# Patient Record
Sex: MALE | Race: WHITE
[De-identification: names, ages, dates, MRNs, and addresses within clinical notes are randomized per-mention and may not be internally consistent; named-entity substitution may affect disease eponyms.]

---

## 2018-10-20 ENCOUNTER — HOSPITAL ENCOUNTER (INPATIENT)
Dept: HOSPITAL 14 - H.ER | Age: 78
LOS: 4 days | Discharge: NURSING, REHAB OR LONG-TERM CARE | DRG: 86 | End: 2018-10-24
Attending: FAMILY MEDICINE | Admitting: FAMILY MEDICINE
Payer: MEDICARE
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
## 2018-10-20 DIAGNOSIS — I44.0: ICD-10-CM

## 2018-10-20 DIAGNOSIS — Z95.1: ICD-10-CM

## 2018-10-20 DIAGNOSIS — K59.00: ICD-10-CM
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
## 2018-10-20 DIAGNOSIS — E11.9: ICD-10-CM

## 2018-10-20 DIAGNOSIS — Z91.81: ICD-10-CM

## 2018-10-20 DIAGNOSIS — I45.10: ICD-10-CM

## 2018-10-20 DIAGNOSIS — G20: ICD-10-CM

## 2018-10-20 DIAGNOSIS — I10: ICD-10-CM

## 2018-10-20 DIAGNOSIS — F02.80: ICD-10-CM

## 2018-10-20 DIAGNOSIS — I25.10: ICD-10-CM

## 2018-10-20 DIAGNOSIS — S22.42XA: ICD-10-CM

## 2018-10-20 DIAGNOSIS — S06.301A: Primary | ICD-10-CM

## 2018-10-20 LAB
ALBUMIN SERPL-MCNC: 4.4 G/DL (ref 3.5–5)
ALBUMIN/GLOB SERPL: 1.2 {RATIO} (ref 1–2.1)
ALT SERPL-CCNC: 25 U/L (ref 21–72)
APTT BLD: 31.5 SECONDS (ref 25.6–37.1)
AST SERPL-CCNC: 34 U/L (ref 17–59)
BACTERIA #/AREA URNS HPF: (no result) /[HPF]
BASOPHILS # BLD AUTO: 0 K/UL (ref 0–0.2)
BASOPHILS NFR BLD: 0.4 % (ref 0–2)
BILIRUB UR-MCNC: NEGATIVE MG/DL
BUN SERPL-MCNC: 31 MG/DL (ref 9–20)
CALCIUM SERPL-MCNC: 9.5 MG/DL (ref 8.4–10.2)
COLOR UR: YELLOW
EOSINOPHIL # BLD AUTO: 0.2 K/UL (ref 0–0.7)
EOSINOPHIL NFR BLD: 1.6 % (ref 0–4)
ERYTHROCYTE [DISTWIDTH] IN BLOOD BY AUTOMATED COUNT: 14.2 % (ref 11.5–14.5)
GFR NON-AFRICAN AMERICAN: 45
GLUCOSE UR STRIP-MCNC: 50 MG/DL
HGB BLD-MCNC: 13 G/DL (ref 12–18)
INR PPP: 1.1
LEUKOCYTE ESTERASE UR-ACNC: (no result) LEU/UL
LYMPHOCYTES # BLD AUTO: 1.4 K/UL (ref 1–4.3)
LYMPHOCYTES NFR BLD AUTO: 14.3 % (ref 20–40)
MCH RBC QN AUTO: 32.2 PG (ref 27–31)
MCHC RBC AUTO-ENTMCNC: 33.1 G/DL (ref 33–37)
MCV RBC AUTO: 97.1 FL (ref 80–94)
MONOCYTES # BLD: 0.9 K/UL (ref 0–0.8)
MONOCYTES NFR BLD: 9.3 % (ref 0–10)
NEUTROPHILS # BLD: 7.3 K/UL (ref 1.8–7)
NEUTROPHILS NFR BLD AUTO: 74.4 % (ref 50–75)
NRBC BLD AUTO-RTO: 0.1 % (ref 0–0)
PH UR STRIP: 5 [PH] (ref 5–8)
PLATELET # BLD: 144 K/UL (ref 130–400)
PMV BLD AUTO: 9.6 FL (ref 7.2–11.7)
PROT UR STRIP-MCNC: 100 MG/DL
PROTHROMBIN TIME: 12.2 SECONDS (ref 9.8–13.1)
RBC # BLD AUTO: 4.05 MIL/UL (ref 4.4–5.9)
RBC # UR STRIP: NEGATIVE /UL
SP GR UR STRIP: 1.02 (ref 1–1.03)
SQUAMOUS EPITHIAL: < 1 /HPF (ref 0–5)
URINE CLARITY: CLEAR
UROBILINOGEN UR-MCNC: (no result) MG/DL (ref 0.2–1)
WBC # BLD AUTO: 9.8 K/UL (ref 4.8–10.8)

## 2018-10-20 NOTE — ED PDOC
HPI: Trauma/Fall





- HPI


Time Seen by Provider: 10/20/18 21:08


Chief Complaint (Nursing): Trauma


Chief Complaint (Provider): Trauma


History Per: Patient


Injury Occurred (Timing): Today @ (1400)


Additional Complaint(s): 





Jonatan Cline is a 78 year old male with a history of Parkinsons, HTN, 

CAD and 5 vessel CABG, who presented to the emergency department after 

sustaining an unwitnessed fall, associated with loss of consciousness, onset 

14:00 today. Patient was found by wife and EMS was called, however, patient 

refused to come with EMS. He took Tylenol for pain on his left side of chest but

had no relief. Patient's pain made him unable to get out of bed. He states he 

also has back pain and pain on left shoulder and chest. Patient denies 

headaches. His history is unreliable because of Parkinson and dementia. 





PMD: Dr. Paulette Wiggins MD


Cardiology: Dr. Gtz





Past Medical History


Reviewed: Historical Data, Nursing Documentation, Vital Signs


Vital Signs: 





                                Last Vital Signs











Temp  98.6 F   10/20/18 21:02


 


Pulse  80   10/20/18 21:02


 


Resp  16   10/20/18 21:02


 


BP  153/84 H  10/20/18 21:02


 


Pulse Ox  99   10/20/18 21:02














- Medical History


PMH: Diabetes, HTN, Parkinson's Disease





- Surgical History


Surgical History: CABG





- Family History


Family History: States: Unknown Family Hx





- Social History


Current smoker - smoking cessation education provided: No


Alcohol: None





- Home Medications


Home Medications: 


                                Ambulatory Orders











 Medication  Instructions  Recorded


 


RX: Carbidopa/Levodopa 25/250 25 - 250 tab PO QID 10/21/18





[Sinemet]  


 


RX: Insulin Glargine,Hum.rec.anlog 35 unit SQ HS 10/21/18





[Basaglar Kwikpen U-100]  


 


RX: Lisinopril [Zestril] 2.5 mg PO DAILY 10/21/18


 


RX: Metoprolol Succinate 25 mg PO DAILY 10/21/18





[Kapspargo Sprinkle]  


 


RX: Multivitamin/Iron/Folic Acid 1 each PO DAILY 10/21/18





[Certavite-Antioxidant Tablet]  


 


RX: Rasagiline Mesylate [Azilect] 1 mg PO DAILY 10/21/18


 


RX: Rosuvastatin Calcium [Crestor] 5 mg PO DAILY 10/21/18


 


RX: SITagliptin [Januvia] 100 mg PO DAILY 10/21/18


 


RX: Ammonium Lactate 12% 1 applic TOP Q8 10/24/18





[Lac-Hydrin 12% Lotion (225 g)]  


 


RX: Gabapentin [Neurontin] 100 mg PO Q12 10/24/18


 


RX: Insulin Human Regular [HumuLIN 0 units SC ACCU-CHECK  ml 10/24/18





R]  


 


RX: Lactulose [Enulose] 10 gm PO DAILY PRN  udc 10/24/18


 


RX: Lidocaine 5% [Lidoderm] 1 patch TOP 0600 10/24/18


 


RX: oxyCODONE/Acetaminophen 1 tab PO Q6 PRN  tab 10/24/18





[Percocet 5/325 mg Tab]  














- Allergies


Allergies/Adverse Reactions: 


                                    Allergies











Allergy/AdvReac Type Severity Reaction Status Date / Time


 


No Known Allergies Allergy   Verified 01/08/16 13:05














Review of Systems


ROS Statement: Except As Marked, All Systems Reviewed And Found Negative


Cardiovascular: Positive for: Chest Pain (left sided)


Musculoskeletal: Positive for: Shoulder Pain (left ), Back Pain


Neurological: Negative for: Headache





Physical Exam





- Reviewed


Nursing Documentation Reviewed: Yes


Vital Signs Reviewed: Yes





- Physical Exam


Appears: Positive for: In Acute Distress (mild painful distress)


Head Exam: Positive for: ATRAUMATIC, NORMOCEPHALIC


Skin: Positive for: Warm, Dry


Eye Exam: Positive for: EOMI, PERRL


ENT: Positive for: Pharynx Is (clear).  Negative for: Tonsillar Swelling


Neck: Positive for: Painless ROM, Supple


Cardiovascular/Chest: Negative for: Chest Non Tender (exquisite ttp to left 

lateral chest wall, posterior chest wall )


Respiratory: Positive for: Decreased Breath Sounds (poor air movement).  

Negative for: Respiratory Distress


Gastrointestinal/Abdominal: Positive for: Soft.  Negative for: Tenderness


Back: Positive for: L CVA Tenderness


Extremity: Positive for: Other (difuse resting tremor; cogwheel rigidity of 

upper arms consistent with parkinson)


Lymphatic: Negative for: Adenopathy


Neurologic/Psych: Positive for: Alert, Oriented (x2).  Negative for: 

Motor/Sensory Deficits





- Laboratory Results


Result Diagrams: 


                                 10/20/18 21:25





                                 10/23/18 09:38





- ECG


O2 Sat by Pulse Oximetry: 99 (RA)


Pulse Ox Interpretation: Normal





Medical Decision Making


Medical Decision Making: 





Initial Time: 21:18


Initial Impression: Fall, LOC and left sided pain


Initial Plan: 


--Ct of ches abd pel with iv contrast 


--Ct of head without contrast


--EKG 


--EDNURTX


--Chest xray 


--Urine Culture


--Type and screen 


--CMP 


--Creatine phosphokinase 


--Lact acid, plasma


--Magnesium 


--Phosphorous


--Troponin


--CBC with differential 


--Saline Lock 


--Sodium chloride 1,000 ml 








Time: 00:00


--Patient is endorsed to Dr. Daugherty pending CT results and re-evaluation.





 

--------------------------------------------------------------------------------


---------------------------------------





Scribe Attestation:


Documented by Mckay Wong, acting as a scribe for Uzma Jaramillo MD.





Provider Scribe Attestation:


All medical record entries made by the Scribe were at my direction and 

personally dictated by me. I have reviewed the chart and agree that the record 

accurately reflects my personal performance of the history, physical exam, 

medical decision making, and the department course for this patient. I have also

personally directed, reviewed, and agree with the discharge instructions and 

disposition.





Disposition





- Clinical Impression


Clinical Impression: 


 Syncope








- Disposition


Disposition: Transfer of Care


Disposition Time: 00:00


Condition: FAIR

## 2018-10-21 LAB — VIT B12 SERPL-MCNC: 444 PG/ML (ref 239–931)

## 2018-10-21 RX ADMIN — ACETAMINOPHEN AND CODEINE PHOSPHATE PRN TAB: 300; 30 TABLET ORAL at 20:11

## 2018-10-21 RX ADMIN — CARBIDOPA AND LEVODOPA SCH TAB: 25; 250 TABLET ORAL at 16:50

## 2018-10-21 RX ADMIN — CARBIDOPA AND LEVODOPA SCH TAB: 25; 250 TABLET ORAL at 13:00

## 2018-10-21 RX ADMIN — CARBIDOPA AND LEVODOPA SCH TAB: 25; 250 TABLET ORAL at 21:59

## 2018-10-21 RX ADMIN — ACETAMINOPHEN AND CODEINE PHOSPHATE PRN TAB: 300; 30 TABLET ORAL at 09:36

## 2018-10-21 RX ADMIN — Medication SCH TAB: at 11:29

## 2018-10-21 RX ADMIN — CARBIDOPA AND LEVODOPA SCH TAB: 25; 250 TABLET ORAL at 11:29

## 2018-10-21 NOTE — CP.PCM.CON
History of Present Illness





- History of Present Illness


History of Present Illness: 





   Neurology consult dictated. 


Patient seen: 78 yr old male with Parkinsons disease and history of falls, who 

fell and now has a small amount of intraventricular hemorrhage layering in 

bilateral atria. 


He is stable with no seizures, and baseline mental status. 


Plan: 


1. Neurosurgery consult. 


2. Hold all anticoagulants


3. Will evaluate parkinsons meds. 





Thank you


Dr. avila 





Past Patient History





- Past Medical History & Family History


Past Medical History?: Yes





- Past Social History


Smoking Status: Never Smoked





- CARDIAC


Hx Hypertension: Yes





- NEUROLOGICAL


Hx Parkinson's Disease: Yes





- ENDOCRINE/METABOLIC


Hx Endocrine Disorders: Yes





- MUSCULOSKELETAL/RHEUMATOLOGICAL


Hx Falls: Yes





- PSYCHIATRIC


Hx Substance Use: No





- SURGICAL HISTORY


Hx Coronary Artery Bypass Graft: Yes





- ANESTHESIA


Hx Anesthesia: Yes


Hx Anesthesia Reactions: No


Hx Malignant Hyperthermia: No





Meds


Allergies/Adverse Reactions: 


                                    Allergies











Allergy/AdvReac Type Severity Reaction Status Date / Time


 


No Known Allergies Allergy   Verified 01/08/16 13:05














- Medications


Medications: 


                               Current Medications





Acetaminophen/Codeine Phosphate (Tylenol/Codeine 300 Mg/30 Mg)  1 tab PO TID PRN


   PRN Reason: Pain, moderate (4-7)


   Last Admin: 10/21/18 09:36 Dose:  1 tab


Carbidopa/Levodopa (Sinemet)  1 tab PO QID [facility]


   Last Admin: 10/21/18 11:29 Dose:  1 tab


Insulin Human Regular (Humulin R)  0 units SC ACCU-CHECK ALEXEI; Protocol


Lidocaine (Lidoderm)  1 ea TD DAILY [facility]


   Last Admin: 10/21/18 11:31 Dose:  Not Given


Lisinopril (Zestril)  2.5 mg PO DAILY [facility]


   Last Admin: 10/21/18 11:28 Dose:  2.5 mg


Metoprolol Succinate (Toprol Xl)  25 mg PO DAILY [facility]


   Last Admin: 10/21/18 11:30 Dose:  25 mg


Multivitamins/Minerals (Therapeutic-M Tab)  1 tab PO DAILY [facility]


   Last Admin: 10/21/18 11:29 Dose:  1 tab


Sitagliptin Phosphate (Januvia)  100 mg PO DAILY [facility]


   Last Admin: 10/21/18 11:30 Dose:  100 mg











Results





- Vital Signs


Recent Vital Signs: 


                                Last Vital Signs











Temp  98.5 F   10/21/18 08:39


 


Pulse  65   10/21/18 08:39


 


Resp  20   10/21/18 08:39


 


BP  173/73 H  10/21/18 08:39


 


Pulse Ox  95   10/21/18 08:39














- Labs


Result Diagrams: 


                                 10/20/18 21:25





                                 10/20/18 21:25


Labs: 


                         Laboratory Results - last 24 hr











  10/20/18 10/20/18 10/20/18





  21:25 21:25 21:25


 


WBC  9.8  


 


RBC  4.05 L  


 


Hgb  13.0  


 


Hct  39.3  


 


MCV  97.1 H  


 


MCH  32.2 H  


 


MCHC  33.1  


 


RDW  14.2  


 


Plt Count  144  


 


MPV  9.6  


 


Neut % (Auto)  74.4  


 


Lymph % (Auto)  14.3 L  


 


Mono % (Auto)  9.3  


 


Eos % (Auto)  1.6  


 


Baso % (Auto)  0.4  


 


Neut # (Auto)  7.3 H  


 


Lymph # (Auto)  1.4  


 


Mono # (Auto)  0.9 H  


 


Eos # (Auto)  0.2  


 


Baso # (Auto)  0.0  


 


PT   


 


INR   


 


APTT   


 


Sodium   143 


 


Potassium   5.1 H 


 


Chloride   108 H 


 


Carbon Dioxide   26 


 


Anion Gap   14 


 


BUN   31 H 


 


Creatinine   1.5 


 


Est GFR ( Amer)   54 


 


Est GFR (Non-Af Amer)   45 


 


Random Glucose   141 H 


 


Lactic Acid    1.5


 


Calcium   9.5 


 


Phosphorus   3.1 


 


Magnesium   2.0 


 


Total Bilirubin   1.6 H 


 


AST   34 


 


ALT   25 


 


Alkaline Phosphatase   80 


 


Total Creatine Kinase   239 H 


 


Troponin I   < 0.0120 


 


Total Protein   8.1 


 


Albumin   4.4 


 


Globulin   3.7 


 


Albumin/Globulin Ratio   1.2 


 


Urine Color   


 


Urine Clarity   


 


Urine pH   


 


Ur Specific Gravity   


 


Urine Protein   


 


Urine Glucose (UA)   


 


Urine Ketones   


 


Urine Blood   


 


Urine Nitrate   


 


Urine Bilirubin   


 


Urine Urobilinogen   


 


Ur Leukocyte Esterase   


 


Urine RBC (Auto)   


 


Urine Microscopic WBC   


 


Ur Squamous Epith Cells   


 


Urine Bacteria   














  10/20/18 10/20/18





  21:25 21:37


 


WBC  


 


RBC  


 


Hgb  


 


Hct  


 


MCV  


 


MCH  


 


MCHC  


 


RDW  


 


Plt Count  


 


MPV  


 


Neut % (Auto)  


 


Lymph % (Auto)  


 


Mono % (Auto)  


 


Eos % (Auto)  


 


Baso % (Auto)  


 


Neut # (Auto)  


 


Lymph # (Auto)  


 


Mono # (Auto)  


 


Eos # (Auto)  


 


Baso # (Auto)  


 


PT  12.2 


 


INR  1.1 


 


APTT  31.5 


 


Sodium  


 


Potassium  


 


Chloride  


 


Carbon Dioxide  


 


Anion Gap  


 


BUN  


 


Creatinine  


 


Est GFR ( Amer)  


 


Est GFR (Non-Af Amer)  


 


Random Glucose  


 


Lactic Acid  


 


Calcium  


 


Phosphorus  


 


Magnesium  


 


Total Bilirubin  


 


AST  


 


ALT  


 


Alkaline Phosphatase  


 


Total Creatine Kinase  


 


Troponin I  


 


Total Protein  


 


Albumin  


 


Globulin  


 


Albumin/Globulin Ratio  


 


Urine Color   Yellow


 


Urine Clarity   Clear


 


Urine pH   5.0


 


Ur Specific Gravity   1.019


 


Urine Protein   100


 


Urine Glucose (UA)   50


 


Urine Ketones   Negative


 


Urine Blood   Negative


 


Urine Nitrate   Negative


 


Urine Bilirubin   Negative


 


Urine Urobilinogen   0.2-1.0


 


Ur Leukocyte Esterase   Neg


 


Urine RBC (Auto)   3


 


Urine Microscopic WBC   < 1


 


Ur Squamous Epith Cells   < 1


 


Urine Bacteria   Occ H

## 2018-10-21 NOTE — RAD
Date of service: 



10/20/2018



HISTORY:

 LEFT sided chest pain trauma 



COMPARISON:

Correlation made with subsequent CT scan chest 10/20/2018 at 2346 hr 



FINDINGS:



LUNGS:

Mild bibasilar atelectasis



PLEURA:

No significant pleural effusion identified, no pneumothorax apparent.



CARDIOVASCULAR:

Minimal aortic atherosclerotic calcification present



Cardiomegaly..



OSSEOUS STRUCTURES:

No old healed fracture deformity left posterior 5th rib. 



VISUALIZED UPPER ABDOMEN:

Normal.



OTHER FINDINGS:

None.



IMPRESSION:

Mild bibasilar atelectasis. 



Cardiomegaly.

## 2018-10-21 NOTE — CARD
--------------- APPROVED REPORT --------------





Date of service: 10/20/2018



EKG Measurement

Heart [id number]

RI [id number]

[id number]

[id number]

[id number]



<Conclusion>

Sinus rhythm with 1st degree AV block

Right bundle branch block

Abnormal ECG

## 2018-10-21 NOTE — CT
Date of service: 



10/21/2018



PROCEDURE:  CT HEAD WITHOUT CONTRAST.



HISTORY:

compare



COMPARISON:

Comparison made with CT scan brain 10/20/2018.



TECHNIQUE:

Axial computed tomography images were obtained through the head/brain 

without intravenous contrast.  



Radiation dose:



Total exam DLP = 944.0 mGy-cm.



This CT exam was performed using one or more of the following dose 

reduction techniques: Automated exposure control, adjustment of the 

mA and/or kV according to patient size, and/or use of iterative 

reconstruction technique.



FINDINGS:



HEMORRHAGE:

Redemonstrated is a small amount of intraventricular hemorrhage is 

again seen layering in the dependent portion of both atria-occipital 

horns. 



BRAIN:

Moderate to fairly significant diffuse and confluent chronic white 

matter ischemic changes seen extending peripherally into the deep and 

subcortical white matter of both cerebral hemispheres.  In addition, 

there are scattered chronic bilateral basal nuclei lacunar type 

infarcts.  



Note that the possibility of a small hyperacute infarct cannot be 

excluded. 



Moderate to significant generalized volume loss 



Dense vascular calcifications both carotid siphons the the the and 

vertebral arteries.. 



VENTRICLES:

As above.  No obstructive hydrocephalus. 



CALVARIUM:

No acute calvarial fractures. 



PARANASAL SINUSES:

Mild mucosal thickening both maxillary antra the 



MASTOID AIR CELLS:

Unremarkable as visualized. No inflammatory changes.



OTHER FINDINGS:

Changes of bilateral cataract surgery.



IMPRESSION:

Redemonstrated is a small amount of intraventricular hemorrhage seen 

layering in the dependent portion of both atria and occipital horns. 



Moderate to significant chronic white matter ischemic changes with 

scattered chronic bilateral basal nuclei lacunar type infarcts. 



Moderate to fairly significant generalized volume loss.

## 2018-10-21 NOTE — RAD
Date of service: 



10/21/2018



PROCEDURE:  Radiographs of the Lumbar Spine.



HISTORY:

fall



COMPARISON:

Comparison made with CT scan chest abdomen pelvis 10/20/2018 which 

imaged the lumbar spine in 3 planes



FINDINGS:



BONES:

No acute fractures 



DISC SPACES:

Minor multilevel degenerative spondylosis lower thoracic and upper 

lumbar region 



OTHER FINDINGS:

None.



IMPRESSION:

No acute fractures. Please refer to CT scan chest abdomen pelvis and 

corresponding report 10/20/2018 for additional details

## 2018-10-21 NOTE — CP.PCM.PN
Subjective





- Date & Time of Evaluation


Date of Evaluation: 10/21/18


Time of Evaluation: 11:51





- Subjective


Subjective: 





79 yo male who has mult falls adm yest am with ct showing  small ampount of 

blood in both occip horns of the ventricles


repeat ct 24 hrs later unchanged


no surgical intervention indicated


neurosurgically clear for d/c





Objective





- Vital Signs/Intake and Output


Vital Signs (last 24 hours): 


                                        











Temp Pulse Resp BP Pulse Ox


 


 98.5 F   65   20   173/73 H  95 


 


 10/21/18 08:39  10/21/18 08:39  10/21/18 08:39  10/21/18 08:39  10/21/18 08:39











- Medications


Medications: 


                               Current Medications





Acetaminophen/Codeine Phosphate (Tylenol/Codeine 300 Mg/30 Mg)  1 tab PO TID PRN


   PRN Reason: Pain, moderate (4-7)


   Last Admin: 10/21/18 09:36 Dose:  1 tab


Carbidopa/Levodopa (Sinemet)  1 tab PO QID [facility]


   Last Admin: 10/21/18 11:29 Dose:  1 tab


Insulin Human Regular (Humulin R)  0 units SC ACCU-CHECK ALEXEI; Protocol


Lidocaine (Lidoderm)  1 ea TD DAILY [facility]


   Last Admin: 10/21/18 11:31 Dose:  Not Given


Lisinopril (Zestril)  2.5 mg PO DAILY [facility]


   Last Admin: 10/21/18 11:28 Dose:  2.5 mg


Metoprolol Succinate (Toprol Xl)  25 mg PO DAILY [facility]


   Last Admin: 10/21/18 11:30 Dose:  25 mg


Multivitamins/Minerals (Therapeutic-M Tab)  1 tab PO DAILY [facility]


   Last Admin: 10/21/18 11:29 Dose:  1 tab


Sitagliptin Phosphate (Januvia)  100 mg PO DAILY [facility]


   Last Admin: 10/21/18 11:30 Dose:  100 mg











- Labs


Labs: 


                                        





                                 10/20/18 21:25 





                                 10/20/18 21:25 





                                        











PT  12.2 Seconds (9.8-13.1)   10/20/18  21:25    


 


INR  1.1   10/20/18  21:25    


 


APTT  31.5 Seconds (25.6-37.1)   10/20/18  21:25

## 2018-10-21 NOTE — ED PDOC
- Laboratory Results


Result Diagrams: 


                                 10/20/18 21:25





                                 10/20/18 21:25





- ECG


O2 Sat by Pulse Oximetry: 95





Medical Decision Making


Medical Decision Making: 


Time: 00:00


--Patient is endorsed to provider by Dr. Jaramillo pending imaging results and 

re-evaluation. 





Time: 0115


--CT Head Findings:


Chest: The pulmonary arteries are well-opacified with contrast, with no 

intraluminal filling defects to suggest embolism. The thoracic aorta is 

unremarkable. There is no pericardial or pleural effusion. The lungs demonstrate

interstitial changes in the lower lobes bilaterally. There is no thoracic 

lymphadenopathy. The thyroid gland is within normal limits.


Abdomen:  The liver, spleen, pancreas, kidneys, and adrenal glands are grossly 

unremarkable. Several simple cysts are seen in the left kidney. Numerous small 

densities are seen in the dependent portion of the gallbladder. No 

pericholecystic inflammatory changes are seen however. The aorta is within 

normal limits. There is no evidence of abdominal lymphadenopathy or ascites.


Pelvis: Moderate amount of stool fills the colon.  The colon is otherwise 

unremarkable, with no obstructive or inflammatory changes. There is mild fluid 

distention of the small bowel with moderate bowel wall thickening. The appendix 

is normal. The urinary bladder is within normal limits. The other pelvic 

structures appear grossly intact. There is no evidence of pelvic lymphadenopathy

or ascites.


Bones: There are no suspicious osseous abnormalities seen.


Impression: 


1. No acute traumatic injury appreciated.


2. Bilateral lower lobe infiltrate/atelectasis.


3. Cholelithiasis without evidence of acute cholecystitis.


4. Simple left renal cysts.


5. Moderate constipation.


6. Mild small bowel enteritis.





Time: 0129


--Patient will be admitted for under medical service, Dr. Cespedes. Dr. Cespedes 

made aware.





--------------------------------------------------------

-----------------------------------------


Scribe Attestation:


Documented by Nickie Barillas, acting as a scribe for Michael Daugherty MD.


Provider Scribe Attestation:


All medical record entries made by the Scribe were at my direction and 

personally dictated by me. I have reviewed the chart and agree that the record 

accurately reflects my personal performance of the history, physical exam, medic

al decision making, and the department course for this patient. I have also 

personally directed, reviewed, and agree with the discharge instructions and 

disposition.





Disposition





- Clinical Impression


Clinical Impression: 


 Syncope








- POA


Present On Arrival: None





- Disposition


Disposition: Admitted as In-Patient


Disposition Time: 02:00


Condition: FAIR

## 2018-10-21 NOTE — CT
Date of service: 



10/20/2018



PROCEDURE:  CT HEAD WITHOUT CONTRAST.



HISTORY:

fall LOC



COMPARISON:

Comparison made with CT scan brain 01/08/2016.



TECHNIQUE:

Axial computed tomography images were obtained through the head/brain 

without intravenous contrast.  



Radiation dose:



Total exam DLP = 1070.56 mGy-cm.



This CT exam was performed using one or more of the following dose 

reduction techniques: Automated exposure control, adjustment of the 

mA and/or kV according to patient size, and/or use of iterative 

reconstruction technique.



FINDINGS:



HEMORRHAGE:

There is a small amount of intraventricular hemorrhage seen layering 

in the dependent portions of both atria/occipital horns.  These 

changes are best demonstrated on axial image number series 4, image # 

19 and 20 



Note that these findings 



BRAIN:

Moderate to significant diffuse and confluent chronic white matter 

ischemic changes seen extending peripherally into the deep and 

subcortical white matter both cerebral hemispheres.  Extension into 

the white matter tracts of both basal nuclei.  Multiple more discrete 

deep and subcortical white matter as well as basal nuclei ischemic 

changes are present. 



No obvious parenchymal nor extra-axial mass or collection seen on 

this noncontrast study. 



Moderate to significant generalized volume loss. 



Dense vascular calcifications both carotid siphons 



VENTRICLES:

No obstructive hydrocephalus however note again made of asymmetry of 

the lateral ventricles left-sided which is larger than the right 

nonspecific.  Fetus B to yoga PA adenoma limits pigtail her in the 



CALVARIUM:

No acute calvarial fractures. There is localized scarring seen along 

the left superior frontal scalp. 



PARANASAL SINUSES:

Normal mucosal thickening right maxillary sinus. 



MASTOID AIR CELLS:

Unremarkable as visualized. No inflammatory changes.



OTHER FINDINGS:

Changes of bilateral cataract surgery.  There along a shin of the 

globes in the AP dimension with questionable coloboma/staphyloma 

formation. 



IMPRESSION:

There is small amount of intraventricular hemorrhage hemorrhage seen 

layering in both atria. 



Moderate to significant chronic white matter and basal nuclei 

ischemic changes. 



Moderate generalized volume loss. 



Findings were placed in PA review folder for follow up.. Note these 

findings were also discussed with emergency room MARQUIS Hidalgo at approximately 10 18 a.m. with written down and read back 

verification

## 2018-10-21 NOTE — CT
Date of service: 



10/20/2018



PROCEDURE:  CT Chest, Abdomen and Pelvis with intravenous contrast



HISTORY:

LEFT flank pain s/p fall



COMPARISON:

Comparison made with chest radiograph obtained earlier same day



TECHNIQUE:

Contiguous helical/transaxial sections of the chest and pelvis 

performed following intravenous injection of approximately 95 cc of 

Omnipaque 300.  Additional 2D sagittal and coronal reformats 

generated 



Radiation dose:



Total exam DLP = 961.7 mGy-cm.



This CT exam was performed using one or more of the following dose 

reduction techniques: Automated exposure control, adjustment of the 

mA and/or kV according to patient size, and/or use of iterative 

reconstruction technique.



FINDINGS:

.  No evidence of effusion or pneumothorax. 



CT CHEST WITH CONTRAST:



LUNGS:

Atelectatic changes are seen within both posterior lower lung fields 

with suspected mild pleural thickening left posterior sulcus..



MEDIASTINUM:

The sternotomy wires again noted heart is enlarged.  No significant 

pericardial effusion..  There is minimal aortic atherosclerotic 

calcification and mural plaque the no evidence of thoracic aortic 

aneurysm.



LYMPH NODES:

No significant mediastinal or hilar adenopathy.  Trachea midline and 

patent.  No large endoluminal lesions.  There is a small hiatal 

hernia with wall thickening of the distal esophagus likely due to 

protrusion gastric mucosa.  Esophagitis not excluded. 



PLEURA:

Tiny left-sided effusion not excluded 



BONES:

There are acute fractures of the left posterolateral 4th, 5th, 6th 

and 7th ribs with old healed fractures deformities of the left 

posterior 5th, 6th 7th and 8th ribs. There may be some localized 

subpleural induration or edema subjacent to the acute rib fractures. 



No evidence of acute compression fractures nor retropulsed fragments 

of the visualized thoracic spine however there are minor minor 

chronic superior endplate deformity of the T2 and to a lesser degree 

T4 and superior T10 endplates..  Vertebral bodies otherwise exhibit 

normal stature.  Vertebral bodies and facets normally aligned. 



Multilevel degenerative spondylosis. 



OTHER FINDINGS:

None.



CT ABDOMEN AND PELVIS:



LIVER:

The liver exhibits normal size measuring approximately 12.8 cm.  

Minimal fatty hepatic infiltration..  No gross lesion or ductal 

dilatation. 



GALLBLADDER AND BILE DUCTS:

Layering intraluminal gallbladder calculi and possible layering 

sludge. 



PANCREAS:

Unremarkable. No gross lesion or ductal dilatation.



SPLEEN:

Unremarkable. 



ADRENALS:

There are small round/elliptical shaped bilateral adrenal lesions; 

right-sided measuring approximately 17 mm and left side measuring 

approximately 13.7 mm...  The Hounsfield units obtained within the 

right adrenal lesion range in the upper 30s to mid 50s and on the 

left mid 40s.  These Hounsfield units are not compatible with simple 

adenoma and therefore follow-up MRI of the adrenal glands recommended 

for further evaluation.



KIDNEYS AND URETERS:

Kidneys appear intact and demonstrate relatively symmetric 

nephrograms.  There are several low-attenuation foci left kidney the 

largest focus along the anterolateral midpole region measuring 

approximately 21 mm.  These foci statistically probably represent 

small cysts.  Renal ultrasound follow-up could confirm. 



VASCULATURE:

There is mild atherosclerotic calcification or mural plaque present. 

Unremarkable. No aortic aneurysm. 



BOWEL:

Evaluation of the bowel is somewhat limited due to lack of oral 

contrast material.  The stomach is incompletely distended which 

presumably in part accounts for thick-walled appearance.  Visualized 

loops of small bowel exhibit normal contour and caliber.  No evidence 

of acute mechanical small bowel obstruction. 



Moderate amount of stool seen within the large bowel particularly 

within the cecum, at ascending and transverse colon consistent with 

fecal retention/constipation. 



APPENDIX:

Normal-appearing appendix. 



PERITONEUM:

Unremarkable. No free fluid. No free air. 



LYMPH NODES:

Unremarkable. No enlarged lymph nodes. 



BLADDER:

Urinary bladder appears incompletely distended however no evidence of 

intraluminal urinary bladder calculi. 



REPRODUCTIVE:

Prostate gland measures approximately 3.9 cm in transverse dimension. 

 Findings likely due to BPH however correlation with PSA recommended. 

 Prostatic calcifications are present. 



BONES:

Mild multilevel degenerative spondylosis of the lumbar spine.  There 

are no acute compression fractures no retropulsed fragments. 



OTHER FINDINGS:

None.



IMPRESSION:

Mild bibasilar atelectasis. 



There are acute fractures of the left posterolateral 4th, 5th, 6th 

and 7th ribs with old healed fractures deformities of the left 

posterior 5th, 6th 7th and 8th ribs..  There may be some localized 

subpleural induration or edema subjacent to the acute rib fractures. 



No evidence of intrathoracic or intra abdominal posttraumatic 

sequela. 



Cardiomegaly. 



Bilateral adrenal lesions right larger than left.  Recommend 

follow-up MRI of the adrenal glands. 



Minimal fatty hepatic infiltration.



There are at least 2 low-attenuation foci left kidney likely 

representing renal cysts.  Renal ultrasound follow-up could be 

performed to confirm. 



Findings consistent with constipation. 



Preliminary report is discordant.  This report was placed in [facility] 

review folder for follow up..

## 2018-10-21 NOTE — RAD
PROCEDURE:  Radiographs of the pelvis and bilateral hips



HISTORY:

 fall with pain 



COMPARISON:

Correlation made with CT scan chest abdomen pelvis 10/20/2018 which 

imaged the pelvis and both hips in 3 planes 



FINDINGS:



BONES:

No evidence of acute displaced fracture nor dislocation. The osseous 

structures appear intact. Both femoral heads are appropriately 

located within the respective acetabula. Minor degenerative 

osteoarthritis both hip joints..



JOINTS:

See above the as above 



SOFT TISSUES:

Vascular calcifications. Metallic clips both inguinal regions 

possibly due to prior inguinal hernia repair 



OTHER FINDINGS:

None.



IMPRESSION:

No evidence of acute displaced fracture nor dislocation. Mild 

degenerative osteoarthritis. 



No acute fractures. Please refer to CT scan chest abdomen pelvis and 

corresponding report 10/20/2018 for additional details

## 2018-10-21 NOTE — ED PDOC
ED Additional Note





- Date & Time of Evaluation


Date of Evaluation: 10/21/18





- Physician Additional Note


Physician Additional Note: 


This writer was called by radiologist re: Head CT performed yesterday that shows

small intracranial hemorrhage without mass effect that was not read last night. 

After patient's chart review, he was admitted under Dr. Cespedes whom I spoke with

about the CT scan finding. He stated understanding of the finding and that he 

would follow-up.

## 2018-10-22 LAB — FOLATE SERPL-MCNC: 11.6 NG/ML

## 2018-10-22 RX ADMIN — CARBIDOPA AND LEVODOPA SCH TAB: 25; 250 TABLET ORAL at 12:04

## 2018-10-22 RX ADMIN — CARBIDOPA AND LEVODOPA SCH TAB: 25; 250 TABLET ORAL at 21:42

## 2018-10-22 RX ADMIN — Medication SCH TAB: at 08:54

## 2018-10-22 RX ADMIN — ACETAMINOPHEN AND CODEINE PHOSPHATE PRN TAB: 300; 30 TABLET ORAL at 09:01

## 2018-10-22 RX ADMIN — CARBIDOPA AND LEVODOPA SCH TAB: 25; 250 TABLET ORAL at 08:54

## 2018-10-22 RX ADMIN — ACETAMINOPHEN AND CODEINE PHOSPHATE PRN TAB: 300; 30 TABLET ORAL at 16:53

## 2018-10-22 RX ADMIN — CARBIDOPA AND LEVODOPA SCH TAB: 25; 250 TABLET ORAL at 16:43

## 2018-10-22 NOTE — CON
DATE:  10/21/2018



NEUROLOGY CONSULT



HISTORY OF PRESENT ILLNESS:  This is an 78-year-old male with past medical

history of Parkinson's disease, hypertension, CAD and 5-vessel CABG, sent

to the emergency department after sustaining a witnessed fall with loss of

consciousness at around 1400 on 10/20/2018.  The patient was found by EMS

and wife; and he was refusing coming to the emergency room, but he

eventually arrived later on the day at around 2100.  He has history of

dementia as well.  Today, the patient is seen on [address] after CT scan was

read and showed to have a biatrial intraventricular hemorrhage.  He is

stable at the point of examination, but he is not able to answer questions

appropriately due to his dementia.



REVIEW OF SYSTEMS:  Not accurate due to mental status.



PAST MEDICAL HISTORY:  Diabetes, hypertension, and Parkinson's disease as

above.



SURGICAL HISTORY:  CABG.



FAMILY HISTORY AND SOCIAL HISTORY:  No alcohol.  No tobacco.  Lives with

family.



ALLERGIES:  NO KNOWN DRUG ALLERGIES.



MEDICATIONS:  As follows:  Aspirin 81 mg p.o. daily; Sinemet 25/250 two

tablets 4 times a day, times not known; insulin glargine 35 units subcu at

bedtime; lisinopril 25 mg p.o. daily; metoprolol 25 mg p.o. daily;

multivitamin, Vasograin; Azilect 1 mg p.o. daily; Crestor 5 mg p.o. daily;

Januvia 100 mg p.o. daily.  _____ not known.



PHYSICAL EXAMINATION:

GENERAL:  The patient is awake, alert; not oriented to person, place or

time.  He cannot name or repeat.  He is able to follow one-step commands

sporadically.

NEUROLOGIC:  Cranial nerves are II through XII normal.  Pupils are equal,

round, and reactive to light.  There is no facial asymmetry.  He does

complain severe right shoulder and leg pain although he can move his

extremities equally with normal strength.  There is a significant resting

tremor noted of high amplitude, that is _____ nature.  There is significant

cogwheel rigidity bilaterally.  There is mask-like facies as well.  The

patient has a  of 5/5.  Sensory exam is not accurate.  Cerebellar gait

is not tested due to the patient's mental status.  Reflexes are +2 upper

and lower limbs bilaterally.



LABORATORY DATA:  As follows:  White count 9.8, hemoglobin 13, hematocrit

39.3, platelet 144.  Coags are normal.  Chemistry:  Sodium 143, potassium

5.1, chloride 108, BUN 31, creatinine 1.5, glucose 141, AST 34, ALT 25,

total creatinine kinase 239.  Urine is normal.  CAT scan of the head was

done and ensured intraatrial and intraventricular hemorrhage that is

layering.



IMPRESSION:  This is an 78-year-old male, who fell and most likely

sustained intracranial hemorrhage secondary to trauma.  There is no

subarachnoid component to his hemorrhage.  There is no subdural.  There is

no skull fracture.  However, it is concerning that his falls are continuing

and that now he sustained intracranial hemorrhage as a result of these

falls.  It is recommended that we adjust his Parkinsonian medications.  In

addition, Neurosurgery should be called for possible intervention.  Please

hold all anticoagulation.  Please control blood pressure aggressively. 

Physical therapy is needed.



Thank you for this interesting consult.





__________________________________________

Aysha Pereyra MD



DD:  10/21/2018 11:44:20

DT:  10/21/2018 14:34:57

Job # [id number]

## 2018-10-22 NOTE — CARD
--------------- APPROVED REPORT --------------





Date of service: 10/21/2018



EKG Measurement

Heart [id number]

PA [id number]

[id number]

[id number]

[id number]



<Conclusion>

Sinus bradycardia with 1st degree AV block

Right bundle branch block

Abnormal ECG

## 2018-10-22 NOTE — CP.PCM.HP
History of Present Illness





- History of Present Illness


History of Present Illness: 





This is an 79 y/o male with hx of CAB CABG Parkinsons who had a fall and loss of

consciousness. CT scan of the head showed a small area of intervent hemorrhage


patient has no complaint of headaches but complains of tenderness on the left 

shoulder and upper back from the fall


and left rib acge area.


 CT scan showed multiple acute rib fracture left anterior 4th 5th 6th and 7th 

ribs. Also noted old fractures of marcos left rib cage.


 Also noted renal cyst and adrenal gland lesions and gallbladder stone.





Present on Admission





- Present on Admission


Any Indicators Present on Admission: No


History of DVT/PE: No


History of Uncontrolled Diabetes: Yes


Urinary Catheter: No


Decubitus Ulcer Present: No





Past Patient History





- Past Medical History & Family History


Past Medical History?: Yes





- Past Social History


Smoking Status: Never Smoked





- CARDIAC


Hx Hypertension: Yes





- NEUROLOGICAL


Hx Parkinson's Disease: Yes





- ENDOCRINE/METABOLIC


Hx Endocrine Disorders: Yes





- MUSCULOSKELETAL/RHEUMATOLOGICAL


Hx Falls: Yes





- PSYCHIATRIC


Hx Substance Use: No





- SURGICAL HISTORY


Hx Coronary Artery Bypass Graft: Yes





- ANESTHESIA


Hx Anesthesia: Yes


Hx Anesthesia Reactions: No


Hx Malignant Hyperthermia: No





Meds


Allergies/Adverse Reactions: 


                                    Allergies











Allergy/AdvReac Type Severity Reaction Status Date / Time


 


No Known Allergies Allergy   Verified 01/08/16 13:05














Physical Exam





- Head Exam


Head Exam: NORMAL INSPECTION





- Eye Exam


Eye Exam: Normal appearance





- Respiratory Exam


Respiratory Exam: Decreased Breath Sounds, Clear to Auscultation Bilateral





- Cardiovascular Exam


Cardiovascular Exam: REGULAR RHYTHM





- GI/Abdominal Exam


GI & Abdominal Exam: Normal Bowel Sounds





- Neurological Exam


Neurological exam: CN II-XII Intact





- Psychiatric Exam


Psychiatric exam: Normal Mood





Results





- Vital Signs


Recent Vital Signs: 





                                Last Vital Signs











Temp  98.5 F   10/22/18 04:41


 


Pulse  60   10/22/18 04:41


 


Resp  19   10/22/18 04:41


 


BP  135/69   10/22/18 04:41


 


Pulse Ox  97   10/22/18 04:41














- Labs


Result Diagrams: 


                                 10/20/18 21:25





                                 10/20/18 21:25


Labs: 





                         Laboratory Results - last 24 hr











  10/21/18





  14:24


 


Vitamin B12  444


 


TSH 3rd Generation  1.51














Assessment & Plan


(1) Cerebral hemorrhage


Status: Acute   





(2) Rib fractures


Status: Acute   





(3) Cerebral hemorrhage


Status: Acute   





- Assessment and Plan (Free Text)


Plan: 





start PT


neuro eval


 follow up with 


telemetry


discussed with family

## 2018-10-23 LAB
BUN SERPL-MCNC: 39 MG/DL (ref 9–20)
CALCIUM SERPL-MCNC: 9.3 MG/DL (ref 8.4–10.2)
GFR NON-AFRICAN AMERICAN: 49

## 2018-10-23 RX ADMIN — OXYCODONE HYDROCHLORIDE AND ACETAMINOPHEN PRN TAB: 5; 325 TABLET ORAL at 14:36

## 2018-10-23 RX ADMIN — CARBIDOPA AND LEVODOPA SCH TAB: 25; 250 TABLET ORAL at 18:20

## 2018-10-23 RX ADMIN — Medication SCH TAB: at 09:10

## 2018-10-23 RX ADMIN — ACETAMINOPHEN AND CODEINE PHOSPHATE PRN TAB: 300; 30 TABLET ORAL at 06:02

## 2018-10-23 RX ADMIN — CARBIDOPA AND LEVODOPA SCH TAB: 25; 250 TABLET ORAL at 14:36

## 2018-10-23 RX ADMIN — CARBIDOPA AND LEVODOPA SCH TAB: 25; 250 TABLET ORAL at 21:43

## 2018-10-23 RX ADMIN — CARBIDOPA AND LEVODOPA SCH TAB: 25; 250 TABLET ORAL at 09:11

## 2018-10-23 NOTE — CARD
--------------- APPROVED REPORT --------------





Date of service: 10/23/2018



EXAM: Two-dimensional and M-mode echocardiogram with Doppler and 

color Doppler.



Other Information 

Quality : AverageRhythm : NSR



INDICATION

Abnormal EKG/Arrhythmia 



2D DIMENSIONS 

IVSd0.96   (0.7-1.1cm)LVDd3.20   (3.9-5.9cm)

LVOT Diameter2.21   (1.8-2.4cm)PWd1.06   (0.7-1.1cm)

IVSs1.26   (0.8-1.2cm)LVDs3.08   (2.5-4.0cm)

FS (%) 3.6   %PWs1.30   (0.8-1.2cm)



M-Mode DIMENSIONS 

Left Atrium (MM)3.62   (2.5-4.0cm)IVSd0.94   (0.7-1.1cm)

Aortic Root3.29   (2.2-3.7cm)LVDd5.44   (4.0-5.6cm)

Aortic Cusp Exc.2.15   (1.5-2.0cm)PWd0.97   (0.7-1.1cm)

IVSs1.18   cmFS (%) 32   %

LVDs3.71   (2.0-3.8cm)PWs1.68   cm



Aortic Valve

AoV Peak Ttamcbda404.6cm/sAoV VTI15.1cmAO Peak GR.4mmHg

LVOT Peak Mgfnhtoo82.1cm/Hayes Mean GR.2mmHgAVA (VMAX)1.46cm2



Mitral Valve

MV E Cluwpqqd21.4cm/sMV DECEL [id number] A Ametmqee09.8cm/s

MV JGG85cnH/A ratio0.8MVA (PHT)3.11cm2



TDI

Lateral E' Peak V9.70cm/sE/Lateral E'4.4E/Medial E'0.0



Tricuspid Valve

TR Peak Higjxzdt228al/sRAP [id number] Peak Gr.19mmHg

ERHU25hfAm



 LEFT VENTRICLE 

The left ventricle is normal size.

There is normal left ventricular wall thickness.

The left ventricular systolic function is normal.

The estimated ejection fraction is 55-60%

No regional wall motion abnormalities noted..

Transmitral Doppler flow pattern is Grade I-abnormal relaxation 

pattern.

No left ventricle thrombus noted on this study.

There is no ventricular septal defect visualized.

There is no left ventricular aneurysm.

There is no mass noted in the left ventricle.



 RIGHT VENTRICLE 

The right ventricle is normal size.

There is normal right ventricular wall thickness.

The right ventricular systolic function is normal.



 ATRIA 

The left atrium size is normal.

The right atrium size is normal.

The interatrial septum is intact with no evidence for an atrial 

septal defect.



 AORTIC VALVE 

The aortic valve is normal in structure.

No aortic regurgitation is present.

There is no aortic valvular stenosis.

There is no aortic valvular vegetation.



 MITRAL VALVE 

The mitral valve is normal in structure.

There is no evidence of mitral valve prolapse.

There is no mitral valve stenosis.

There is no mitral valve regurgitation noted.



 TRICUSPID VALVE 

The tricuspid valve is normal in structure.

There is trace tricuspid valve regurgitation noted. RVSP is 

calculated at 29 mm Hg,

There is no tricuspid valve prolapse or vegetation.

There is no tricuspid valve stenosis.



 PULMONIC VALVE 

The pulmonary valve is normal in structure.

There is no pulmonic valvular regurgitation.

There is no pulmonic valvular stenosis.



 GREAT VESSELS 

The aortic root is normal in size.

The ascending aorta is normal in size.

The pulmonary artery is normal.

The IVC is normal in size and collapses >50% with inspiration.



 PERICARDIAL EFFUSION 

There is no pericardial effusion.

There is no pleural effusion.



<Conclusion>

Technically difficult study.

The estimated ejection fraction is 55-60%

Transmitral Doppler flow pattern is Grade I-abnormal relaxation 

pattern.

The left atrium size is normal.

There is trace tricuspid valve regurgitation noted. RVSP is 

calculated at 29 mm Hg,

## 2018-10-23 NOTE — CP.PCM.CON
History of Present Illness





- History of Present Illness


History of Present Illness: 





I was asked to see patient by DR Cespedes.  Patient seen on 10/23/18 [id number]





Patient is a 78 year old malewith HTN, CAD s/p CABG, Parkinson's who presents 

with a fall.  The patient's wife states he was at home when he lost his balance.

 The patient was suffered rib fractures.  He denies chest pain.  His baseline 

EKG reveals sinus bradycardia with first degree AV block and RBBB.  The patient 

has had heart rate in the 40s.





Review of Systems





- Constitutional


Constitutional: absent: As Per HPI, Anorexia, Chills, Daytime Sleepiness, 

Excessive Sweating, Fatigue, Fever, Frequent Falls, Headache, Increased 

Appetite, Lethargy, Malaise, Night Sweats, Snoring, Sleep Apnea, Weight Gain, 

Weight Loss, Weakness, Other





- EENT


Eyes: absent: As Per HPI, Blind Spots, Blurred Vision, Change in Vision, 

Decreased Night Vision, Diplopia, Discharge, Dry Eye, Exophthalmos, Floaters, 

Irritation, Itchy Eyes, Loss of Peripheral Vision, Pain, Photophobia, Requires 

Corrective Lenses, Sees Flashes, Spots in Vision, Tunnel Vision, Other Visual 

Disturbances, Loss of Vision, Other


Ears: absent: As Per HPI, Decreased Hearing, Ear Discharge, Ear Pain, Tinnitus, 

Abnormal Hearing, Disequilibrium, Dizziness, Other


Nose/Mouth/Throat: absent: As Per HPI, Epistaxis, Nasal Congestion, Nasal 

Discharge, Nasal Obstruction, Nasal Trauma, Nose Pain, Post Nasal Drip, Sinus 

Pain, Sinus Pressure, Bleeding Gums, Change in Voice, Dental Pain, Dry Mouth, 

Dysphagia, Halitosis, Hoarsness, Lip Swelling, Mouth Lesions, Mouth Pain, 

Odynophagia, Sore Throat, Throat Swelling, Tongue Swelling, Facial Pain, Neck 

Pain, Neck Mass, Other





- Cardiovascular


Cardiovascular: Slow Heart Rate, Syncope





- Respiratory


Respiratory: absent: As Per HPI, Cough, Dyspnea, Hemoptysis, Dyspnea on 

Exertion, Wheezing, Snoring, Stridor, Pain on Inspiration, Chest Congestion, 

Excessive Mucous Production, Change in Mucous Color, Pain with Coughing, Other





- Gastrointestinal


Gastrointestinal: absent: As Per HPI, Abdominal Pain, Belching, Bloating, Change

in Bowel Habits, Change in Stool Character, Coffee Ground Emesis, Constipation, 

Cramping, Diarrhea, Dyspepsia, Dysphagia, Early Satiety, Excessive Flatus, Fecal

Incontinence, Heartburn, Hematemesis, Hematochezia, Loose Stools, Melena, 

Nausea, Odynophagia, Temesmus, Vomiting, Other





- Genitourinary


Genitourinary: absent: As Per HPI, Change in Urinary Stream, Difficulty 

Urinating, Dysuria, Flank Pain, Hematuria, Pyuria, Nocturia, Urinary 

Incontinence, Urinary Frequency, Urinary Hesitance, Urinary Urgency, Voiding 

Freq/Small Amts, Freq UTI, Hx Renal/Bladder Calculi, Hx /Renal Surgery, 

Bladder Distension, Other





- Musculoskeletal


Musculoskeletal: Limited Range of Motion, Muscle Weakness, Stiffness





- Integumentary


Integumentary: absent: As Per HPI, Acne, Alopecia, Bleeding Lesions, Change in 

Hair, Change in Nails, Change in Pigmentation, Changing Lesions, Dry Skin, 

Erythema, Furuncle, Hirsutism, Lesions, New Lesions, Non-Healing Lesions, Ryne

tosensitivity, Pruritus, Rash, Skin Pain, Skin Ulcer, Sores, Striae, Swelling, 

Unusual Bruising, Wounds, Jaundice, Other





- Neurological


Neurological: Disequilibrium, Dizziness, Syncope





- Psychiatric


Psychiatric: absent: As Per HPI, Abnormal Sleep Pattern, Anhedonia, Anxiety, 

Auditory Hallucinations, Behavioral Changes, Change in Appetite, Change in 

Libido, Confusion, Depression, Difficulty Concentrating, Hallucinations, 

Homicidal Ideation, Hopelessness, Irritability, Memory Loss, Mood Swings, Panic 

Attacks, Paranoia, Suicidal Ideation, Visual Hallucinations, Tactile 

Hallucinations, Other





- Endocrine


Endocrine: absent: As Per HPI, Change in Body Appearance, Change in Libido, Cold

Intolorance, Deepening of Voice, Excessive Sweating, Fatigue, Flushing, Heat 

Intolorance, Increase in Ring/Shoe/Hat Size, Palpitations, Polydipsia, 

Polyphagia, Polyuria, Other





- Hematologic/Lymphatic


Hematologic: absent: As Per HPI, Easy Bleeding, Easy Bruising, Lymphadenopathy, 

Other





Past Patient History





- Past Medical History & Family History


Past Medical History?: Yes





- Past Social History


Smoking Status: Never Smoked





- CARDIAC


Hx Hypertension: Yes





- NEUROLOGICAL


Hx Parkinson's Disease: Yes





- ENDOCRINE/METABOLIC


Hx Endocrine Disorders: Yes





- MUSCULOSKELETAL/RHEUMATOLOGICAL


Hx Falls: Yes





- PSYCHIATRIC


Hx Substance Use: No





- SURGICAL HISTORY


Hx Coronary Artery Bypass Graft: Yes





- ANESTHESIA


Hx Anesthesia: Yes


Hx Anesthesia Reactions: No


Hx Malignant Hyperthermia: No





Meds


Allergies/Adverse Reactions: 


                                    Allergies











Allergy/AdvReac Type Severity Reaction Status Date / Time


 


No Known Allergies Allergy   Verified 01/08/16 13:05














- Medications


Medications: 


                               Current Medications





Carbidopa/Levodopa (Sinemet)  1 tab PO QID [facility]


   Last Admin: 10/23/18 14:36 Dose:  1 tab


Gabapentin (Neurontin)  100 mg PO BID [facility]


Insulin Human Regular (Humulin R)  0 units SC ACCU-CHECK [facility]; Protocol


   Last Admin: 10/23/18 11:48 Dose:  6 units


Lactic Acid (Lac-Hydrin 12% Lotion (225 G))  1 applic TOP TID [facility]


   Last Admin: 10/23/18 14:35 Dose:  Not Given


Lidocaine (Lidoderm)  1 ea TD DAILY [facility]


   Last Admin: 10/23/18 09:10 Dose:  1 ea


Lisinopril (Zestril)  2.5 mg PO DAILY [facility]


   Last Admin: 10/23/18 09:10 Dose:  2.5 mg


Metoprolol Succinate (Toprol Xl)  25 mg PO DAILY [facility]


   Last Admin: 10/21/18 11:30 Dose:  25 mg


Multivitamins/Minerals (Therapeutic-M Tab)  1 tab PO DAILY [facility]


   Last Admin: 10/23/18 09:10 Dose:  1 tab


Oxycodone/Acetaminophen (Percocet 5/325 Mg Tab)  1 tab PO Q6 PRN


   PRN Reason: Pain, severe (8-10)


   Stop: 10/26/18 11:40


   Last Admin: 10/23/18 14:36 Dose:  1 tab


Sitagliptin Phosphate (Januvia)  100 mg PO DAILY [facility]


   Last Admin: 10/23/18 09:10 Dose:  100 mg











Physical Exam





- Constitutional


Appears: Non-toxic





- Head Exam


Head Exam: NORMAL INSPECTION





- Eye Exam


Eye Exam: Normal appearance





- ENT Exam


ENT Exam: Mucous Membranes Moist





- Neck Exam


Neck exam: Positive for: Full Rom





- Respiratory Exam


Respiratory Exam: Decreased Breath Sounds





- Cardiovascular Exam


Cardiovascular Exam: Bradycardia, REGULAR RHYTHM





- GI/Abdominal Exam


GI & Abdominal Exam: Normal Bowel Sounds.  absent: Mass





- Rectal Exam


Rectal Exam: Deferred





- Extremities Exam


Extremities exam: Negative for: pedal edema





- Back Exam


Back exam: NORMAL INSPECTION





- Neurological Exam


Neurological exam: Alert, Oriented x3





- Psychiatric Exam


Psychiatric exam: Normal Affect





- Skin


Skin Exam: Normal Color





Results





- Vital Signs


Recent Vital Signs: 


                                Last Vital Signs











Temp  97.3 F L  10/23/18 15:47


 


Pulse  70   10/23/18 15:47


 


Resp  20   10/23/18 15:47


 


BP  137/81   10/23/18 15:47


 


Pulse Ox  96   10/23/18 15:47














- Labs


Result Diagrams: 


                                 10/20/18 21:25





                                 10/23/18 09:38


Labs: 


                         Laboratory Results - last 24 hr











  10/22/18 10/23/18 10/23/18





  21:09 05:05 09:38


 


Sodium    139


 


Potassium    4.7


 


Chloride    101


 


Carbon Dioxide    29


 


Anion Gap    14


 


BUN    39 H


 


Creatinine    1.4


 


Est GFR ( Amer)    59


 


Est GFR (Non-Af Amer)    49


 


POC Glucose (mg/dL)  179 H  123 H 


 


Random Glucose    157 H


 


Calcium    9.3














  10/23/18 10/23/18 10/23/18





  11:33 16:09 17:44


 


Sodium   


 


Potassium   


 


Chloride   


 


Carbon Dioxide   


 


Anion Gap   


 


BUN   


 


Creatinine   


 


Est GFR ( Amer)   


 


Est GFR (Non-Af Amer)   


 


POC Glucose (mg/dL)  257 H  134 H  110


 


Random Glucose   


 


Calcium   














- EKG Data


EKG Interpreted by: Myself


Rate: Bradycardia





Assessment & Plan


(1) Syncope


Assessment and Plan: 


the etiology is unclear and may be multifactorial.  recommend holding 

betablocker.  I discussed case with the patient's primary cardiologist Dr Gtz.  He has baseline sinus bradycardia with RBBB and first degree AV bl

ock.  seen on his office EKG.


Status: Acute   





(2) HTN (hypertension)


Assessment and Plan: 


will monitor blood pressure


Status: Acute   





(3) CAD (coronary artery disease)


Assessment and Plan: 


s/p CABG.  no angina


Status: Acute

## 2018-10-23 NOTE — CP.PCM.PN
Subjective





- Date & Time of Evaluation


Date of Evaluation: 10/23/18


Time of Evaluation: 11:00





- Subjective


Subjective: 


patient in bathroom during rounds. spoke with nursing staff as well as house 

staff. no acute events overnight. Seen by PT, neurology, neurosurgery. Pain 

management pending. 





Objective





- Vital Signs/Intake and Output


Vital Signs (last 24 hours): 


                                        











Temp Pulse Resp BP Pulse Ox


 


 97.7 F   72   18   108/67   96 


 


 10/23/18 11:57  10/23/18 11:57  10/23/18 11:57  10/23/18 11:57  10/23/18 11:57











- Medications


Medications: 


                               Current Medications





Carbidopa/Levodopa (Sinemet)  1 tab PO QID [facility]


   Last Admin: 10/23/18 09:11 Dose:  1 tab


Gabapentin (Neurontin)  100 mg PO BID [facility]


Insulin Human Regular (Humulin R)  0 units SC ACCU-CHECK [facility]; Protocol


   Last Admin: 10/23/18 11:48 Dose:  6 units


Lactic Acid (Lac-Hydrin 12% Lotion (225 G))  1 applic TOP TID [facility]


   Last Admin: 10/23/18 11:47 Dose:  1 applic


Lidocaine (Lidoderm)  1 ea TD DAILY [facility]


   Last Admin: 10/23/18 09:10 Dose:  1 ea


Lisinopril (Zestril)  2.5 mg PO DAILY [facility]


   Last Admin: 10/23/18 09:10 Dose:  2.5 mg


Metoprolol Succinate (Toprol Xl)  25 mg PO DAILY [facility]


   Last Admin: 10/21/18 11:30 Dose:  25 mg


Multivitamins/Minerals (Therapeutic-M Tab)  1 tab PO DAILY [facility]


   Last Admin: 10/23/18 09:10 Dose:  1 tab


Oxycodone/Acetaminophen (Percocet 5/325 Mg Tab)  1 tab PO Q6 PRN


   PRN Reason: Pain, severe (8-10)


   Stop: 10/26/18 11:40


Sitagliptin Phosphate (Januvia)  100 mg PO DAILY [facility]


   Last Admin: 10/23/18 09:10 Dose:  100 mg











- Labs


Labs: 


                                        





                                 10/20/18 21:25 





                                 10/23/18 09:38 





                                        











PT  12.2 Seconds (9.8-13.1)   10/20/18  21:25    


 


INR  1.1   10/20/18  21:25    


 


APTT  31.5 Seconds (25.6-37.1)   10/20/18  21:25    














Assessment and Plan





- Assessment and Plan (Free Text)


Assessment: 


79 y/o male with hx of CAB CABG Parkinsons who had a fall and loss of 

consciousness. CT scan of the head showed a small area of intervent hemorrhage.





continue PT, recommends acute rehab


bradycardia improved, cardiology consult pending


neurosurgery cleared patient


pain management pending evaluation


c/w plan as ordered

## 2018-10-23 NOTE — CP.PCM.CON
History of Present Illness





- History of Present Illness


History of Present Illness: 


77 yo man s/p fall is referred for pain management.  Patient complains mainly of

left shoulder pain and left lateral chest wall pain. Patient was found to be 

acute on chronic 4 rib fractures.  Thus far, Lidoderm patches and T#3 haven't 

helped with his pain.





Past Patient History





- Past Medical History & Family History


Past Medical History?: Yes





- Past Social History


Smoking Status: Never Smoked





- CARDIAC


Hx Hypertension: Yes





- NEUROLOGICAL


Hx Parkinson's Disease: Yes





- ENDOCRINE/METABOLIC


Hx Endocrine Disorders: Yes





- MUSCULOSKELETAL/RHEUMATOLOGICAL


Hx Falls: Yes





- PSYCHIATRIC


Hx Substance Use: No





- SURGICAL HISTORY


Hx Coronary Artery Bypass Graft: Yes





- ANESTHESIA


Hx Anesthesia: Yes


Hx Anesthesia Reactions: No


Hx Malignant Hyperthermia: No





Meds


Allergies/Adverse Reactions: 


                                    Allergies











Allergy/AdvReac Type Severity Reaction Status Date / Time


 


No Known Allergies Allergy   Verified 01/08/16 13:05














- Medications


Medications: 


                               Current Medications





Carbidopa/Levodopa (Sinemet)  1 tab PO QID [facility]


   Last Admin: 10/23/18 09:11 Dose:  1 tab


Gabapentin (Neurontin)  100 mg PO BID [facility]


Insulin Human Regular (Humulin R)  0 units SC ACCU-CHECK [facility]; Protocol


   Last Admin: 10/23/18 06:04 Dose:  Not Given


Lactic Acid (Lac-Hydrin 12% Lotion (225 G))  1 applic TOP TID [facility]


   Last Admin: 10/23/18 09:10 Dose:  1 applic


Lidocaine (Lidoderm)  1 ea TD DAILY [facility]


   Last Admin: 10/23/18 09:10 Dose:  1 ea


Lisinopril (Zestril)  2.5 mg PO DAILY [facility]


   Last Admin: 10/23/18 09:10 Dose:  2.5 mg


Metoprolol Succinate (Toprol Xl)  25 mg PO DAILY [facility]


   Last Admin: 10/21/18 11:30 Dose:  25 mg


Multivitamins/Minerals (Therapeutic-M Tab)  1 tab PO DAILY [facility]


   Last Admin: 10/23/18 09:10 Dose:  1 tab


Oxycodone/Acetaminophen (Percocet 5/325 Mg Tab)  1 tab PO Q6 PRN


   PRN Reason: Pain, severe (8-10)


   Stop: 10/26/18 11:40


Sitagliptin Phosphate (Januvia)  100 mg PO DAILY [facility]


   Last Admin: 10/23/18 09:10 Dose:  100 mg











Physical Exam





- Respiratory Exam


Respiratory Exam: Chest Wall Tenderness





Results





- Vital Signs


Recent Vital Signs: 


                                Last Vital Signs











Temp  97.5 F L  10/23/18 08:01


 


Pulse  75   10/23/18 09:10


 


Resp  18   10/23/18 08:01


 


BP  121/73   10/23/18 09:10


 


Pulse Ox  96   10/23/18 08:01














- Labs


Result Diagrams: 


                                 10/20/18 21:25





                                 10/23/18 09:38


Labs: 


                         Laboratory Results - last 24 hr











  10/21/18 10/22/18 10/22/18





  14:24 16:09 21:09


 


Sodium   


 


Potassium   


 


Chloride   


 


Carbon Dioxide   


 


Anion Gap   


 


BUN   


 


Creatinine   


 


Est GFR ( Amer)   


 


Est GFR (Non-Af Amer)   


 


POC Glucose (mg/dL)   142 H  179 H


 


Random Glucose   


 


Calcium   


 


Folate  11.6  














  10/23/18 10/23/18 10/23/18





  05:05 09:38 11:33


 


Sodium   139 


 


Potassium   4.7 


 


Chloride   101 


 


Carbon Dioxide   29 


 


Anion Gap   14 


 


BUN   39 H 


 


Creatinine   1.4 


 


Est GFR ( Amer)   59 


 


Est GFR (Non-Af Amer)   49 


 


POC Glucose (mg/dL)  123 H   257 H


 


Random Glucose   157 H 


 


Calcium   9.3 


 


Folate   














Assessment & Plan





- Assessment and Plan (Free Text)


Assessment: 


77 yo man s/p fall, has left shoulder and chest wall pain from fractures.  Inter

costal nerve blocks are short lasting, and risky due to number of ribs involved,

as a result not indicated.  PO NSAIDs are contraindicated due to bleeding.  T#3 

is insufficient, can increase to Percocet.  Add Neurontin for neuropathic 

component of pain.





- d/c T#3, start Percocet 5/325mg q6h PRN (can change to Vicodin for outpatient 

if Percocet is too strong, but Vicodin isn't on formulary)


- add Neurontin 100mg BID, titrate as needed


- can add Voltaren gel to chest wall for outpatient use, gel is not formulary

## 2018-10-24 ENCOUNTER — HOSPITAL ENCOUNTER (INPATIENT)
Dept: HOSPITAL 14 - H.REHABAC | Age: 78
LOS: 16 days | Discharge: HOME HEALTH | DRG: 66 | End: 2018-11-09
Attending: FAMILY MEDICINE | Admitting: FAMILY MEDICINE
Payer: MEDICARE

## 2018-10-24 VITALS — DIASTOLIC BLOOD PRESSURE: 60 MMHG | SYSTOLIC BLOOD PRESSURE: 102 MMHG | HEART RATE: 71 BPM | TEMPERATURE: 97.8 F

## 2018-10-24 VITALS — BODY MASS INDEX: 26.1 KG/M2

## 2018-10-24 VITALS — RESPIRATION RATE: 18 BRPM

## 2018-10-24 DIAGNOSIS — Z95.1: ICD-10-CM

## 2018-10-24 DIAGNOSIS — Z91.81: ICD-10-CM

## 2018-10-24 DIAGNOSIS — E11.9: ICD-10-CM

## 2018-10-24 DIAGNOSIS — S22.49XD: ICD-10-CM

## 2018-10-24 DIAGNOSIS — I61.9: Primary | ICD-10-CM

## 2018-10-24 DIAGNOSIS — I25.10: ICD-10-CM

## 2018-10-24 DIAGNOSIS — I10: ICD-10-CM

## 2018-10-24 DIAGNOSIS — G20: ICD-10-CM

## 2018-10-24 PROCEDURE — F07Z9FZ GAIT TRAINING/FUNCTIONAL AMBULATION TREATMENT USING ASSISTIVE, ADAPTIVE, SUPPORTIVE OR PROTECTIVE EQUIPMENT: ICD-10-PCS

## 2018-10-24 PROCEDURE — F08Z4FZ HOME MANAGEMENT TREATMENT USING ASSISTIVE, ADAPTIVE, SUPPORTIVE OR PROTECTIVE EQUIPMENT: ICD-10-PCS

## 2018-10-24 RX ADMIN — CARBIDOPA AND LEVODOPA SCH TAB: 25; 250 TABLET ORAL at 14:34

## 2018-10-24 RX ADMIN — Medication SCH TAB: at 09:57

## 2018-10-24 RX ADMIN — OXYCODONE HYDROCHLORIDE AND ACETAMINOPHEN PRN TAB: 5; 325 TABLET ORAL at 10:18

## 2018-10-24 RX ADMIN — CARBIDOPA AND LEVODOPA SCH TAB: 25; 250 TABLET ORAL at 22:08

## 2018-10-24 RX ADMIN — CARBIDOPA AND LEVODOPA SCH TAB: 25; 250 TABLET ORAL at 09:56

## 2018-10-25 VITALS — OXYGEN SATURATION: 99 %

## 2018-10-25 LAB
ALBUMIN SERPL-MCNC: 3.9 G/DL (ref 3.5–5)
ALBUMIN/GLOB SERPL: 1.1 {RATIO} (ref 1–2.1)
ALT SERPL-CCNC: 12 U/L (ref 21–72)
AST SERPL-CCNC: 18 U/L (ref 17–59)
BUN SERPL-MCNC: 78 MG/DL (ref 9–20)
CALCIUM SERPL-MCNC: 9.1 MG/DL (ref 8.4–10.2)
ERYTHROCYTE [DISTWIDTH] IN BLOOD BY AUTOMATED COUNT: 13.6 % (ref 11.5–14.5)
GFR NON-AFRICAN AMERICAN: 31
HGB BLD-MCNC: 13.4 G/DL (ref 12–18)
MCH RBC QN AUTO: 33.1 PG (ref 27–31)
MCHC RBC AUTO-ENTMCNC: 34.7 G/DL (ref 33–37)
MCV RBC AUTO: 95.3 FL (ref 80–94)
PLATELET # BLD: 161 K/UL (ref 130–400)
RBC # BLD AUTO: 4.06 MIL/UL (ref 4.4–5.9)
WBC # BLD AUTO: 10 K/UL (ref 4.8–10.8)

## 2018-10-25 PROCEDURE — F07M6FZ THERAPEUTIC EXERCISE TREATMENT OF MUSCULOSKELETAL SYSTEM - WHOLE BODY USING ASSISTIVE, ADAPTIVE, SUPPORTIVE OR PROTECTIVE EQUIPMENT: ICD-10-PCS

## 2018-10-25 RX ADMIN — CARBIDOPA AND LEVODOPA SCH TAB: 25; 250 TABLET ORAL at 09:18

## 2018-10-25 RX ADMIN — CARBIDOPA AND LEVODOPA SCH TAB: 25; 250 TABLET ORAL at 12:47

## 2018-10-25 RX ADMIN — OXYCODONE HYDROCHLORIDE AND ACETAMINOPHEN PRN TAB: 5; 325 TABLET ORAL at 17:20

## 2018-10-25 RX ADMIN — OXYCODONE HYDROCHLORIDE AND ACETAMINOPHEN PRN TAB: 5; 325 TABLET ORAL at 10:18

## 2018-10-25 RX ADMIN — Medication SCH TAB: at 09:18

## 2018-10-25 RX ADMIN — CARBIDOPA AND LEVODOPA SCH TAB: 25; 250 TABLET ORAL at 17:20

## 2018-10-25 RX ADMIN — INSULIN DETEMIR SCH UNITS: 100 INJECTION, SOLUTION SUBCUTANEOUS at 21:35

## 2018-10-25 RX ADMIN — CARBIDOPA AND LEVODOPA SCH TAB: 25; 250 TABLET ORAL at 21:25

## 2018-10-25 NOTE — PCM.OPOC
Physiatry Overall Plan of Care





- Overall Plan of Care


Estimated Length of Stay in Weeks: 3


Rehab Impairment: Mobility, Gait, Cognition, Balance, Coordination


Etiologic Diagnosis: Traumatic Brain Injury


Rehab/Medical Prognosis: Fair





- Anticipated Interventions


Physical Therapy:: Yes


Occupational Therapy:: Yes


Speech Therapy:: Yes


Recreational Therapy:: Yes





- Therapy Goals


Bed Mobility: Independent


Ambulation: Supervision


Functional Positional Changes:: Independent





- Functional Outcomes


Functional Outcomes: fair





- Discharge Plan


Identification of Barriers to Discharge: Cognition


Discharge Destination: Home

## 2018-10-25 NOTE — CP.PCM.CON
History of Present Illness





- History of Present Illness


History of Present Illness: 





78 year old patient with diagnosis of intracranial Hge, CAD status post 5 vessel

bypass, parkinsons disease, admitted for acute rehab





Review of Systems





- Musculoskeletal


Musculoskeletal: Muscle Weakness





Past Patient History





- Past Medical History & Family History


Past Medical History?: Yes





- Past Social History


Smoking Status: Never Smoked





- CARDIAC


Hx Cardiac Disorders: Yes


Hx Hypertension: Yes





- PULMONARY


Hx Respiratory Disorders: No





- NEUROLOGICAL


Hx Parkinson's Disease: Yes


Hx Syncope: Yes





- HEENT


Hx HEENT Problems: No


Other/Comment: uses eye glasses for reading





- RENAL


Hx Chronic Kidney Disease: No





- ENDOCRINE/METABOLIC


Hx Diabetes Mellitus Type 2: Yes





- HEMATOLOGICAL/ONCOLOGICAL


Hx AIDS: No


Hx Human Immunodeficiency Virus (HIV): No





- INTEGUMENTARY


Hx Dermatological Problems: No





- MUSCULOSKELETAL/RHEUMATOLOGICAL


Hx Falls: Yes


Hx Fractures: Yes





- GASTROINTESTINAL


Hx Gastrointestinal Disorders: No





- GENITOURINARY/GYNECOLOGICAL


Hx Genitourinary Disorders: No





- PSYCHIATRIC


Hx Psychophysiologic Disorder: No


Hx Substance Use: No





- SURGICAL HISTORY


Hx Coronary Artery Bypass Graft: Yes





- ANESTHESIA


Hx Anesthesia: Yes


Hx Anesthesia Reactions: No


Hx Malignant Hyperthermia: No


Has any member of the family had a problem w/ anesthesia?: No





Meds


Allergies/Adverse Reactions: 


                                    Allergies











Allergy/AdvReac Type Severity Reaction Status Date / Time


 


No Known Allergies Allergy   Verified 01/08/16 13:05














- Medications


Medications: 


                               Current Medications





Carbidopa/Levodopa (Sinemet)  1 tab PO QID [facility]


   Last Admin: 10/25/18 17:20 Dose:  1 tab


Gabapentin (Neurontin)  100 mg PO Q12 [facility]


   Last Admin: 10/25/18 09:18 Dose:  100 mg


Insulin Detemir (Levemir)  35 units SC [facility]


Insulin Human Regular (Humulin R)  0 units SC ACCU-CHECK [facility]; Protocol


   Last Admin: 10/25/18 17:21 Dose:  4 units


Lactic Acid (Lac-Hydrin 12% Lotion (225 G))  1 applic TOP Q8 [facility]


   Last Admin: 10/25/18 13:30 Dose:  1 applic


Lactulose (Enulose)  10 gm PO DAILY PRN


   PRN Reason: Constipation


Lidocaine (Lidoderm)  1 ea TD 0900 [facility]


   Last Admin: 10/25/18 09:11 Dose:  1 ea


Lisinopril (Zestril)  2.5 mg PO DAILY [facility]


   Last Admin: 10/25/18 09:18 Dose:  2.5 mg


Multivitamins/Minerals (Therapeutic-M Tab)  1 tab PO DAILY [facility]


   Last Admin: 10/25/18 09:18 Dose:  1 tab


Oxycodone/Acetaminophen (Percocet 5/325 Mg Tab)  1 tab PO Q6 PRN


   PRN Reason: Pain, (4-10)


   Stop: 10/27/18 17:18


   Last Admin: 10/25/18 17:20 Dose:  1 tab


Sitagliptin Phosphate (Januvia)  100 mg PO DAILY [facility]


   Last Admin: 10/25/18 09:11 Dose:  100 mg











Physical Exam





- Constitutional


Appears: Well





- Eye Exam


Eye Exam: EOMI, Normal appearance


Pupil Exam: NORMAL ACCOMODATION





- ENT Exam


ENT Exam: Mucous Membranes Moist, Normal Exam





- Neck Exam


Neck exam: Positive for: Normal Inspection





- Respiratory Exam


Respiratory Exam: Clear to Auscultation Bilateral, NORMAL BREATHING PATTERN





- Cardiovascular Exam


Cardiovascular Exam: REGULAR RHYTHM





- GI/Abdominal Exam


GI & Abdominal Exam: Normal Bowel Sounds





- Rectal Exam


Rectal Exam: NORMAL INSPECTION





-  Exam


External exam: NORMAL EXTERNAL EXAM





- Extremities Exam


Extremities exam: Positive for: normal inspection


Additional comments: 





both upper and lower extremities muscle strength 3/5





- Back Exam


Back exam: NORMAL INSPECTION





- Neurological Exam


Neurological exam: Alert, CN II-XII Intact





- Psychiatric Exam


Psychiatric exam: Flat Affect





- Skin


Skin Exam: Dry, Normal Color





Results





- Vital Signs


Recent Vital Signs: 


                                Last Vital Signs











Temp  97.7 F   10/25/18 08:30


 


Pulse  71   10/25/18 09:18


 


Resp  22   10/25/18 08:30


 


BP  124/63   10/25/18 09:18


 


Pulse Ox  96   10/25/18 08:54














- Labs


Result Diagrams: 


                                 10/25/18 06:10





                                 10/25/18 06:10


Labs: 


                         Laboratory Results - last 24 hr











  10/24/18 10/25/18 10/25/18





  22:00 05:52 06:10


 


WBC    10.0


 


RBC    4.06 L


 


Hgb    13.4


 


Hct    38.7


 


MCV    95.3 H


 


MCH    33.1 H


 


MCHC    34.7


 


RDW    13.6


 


Plt Count    161


 


Sodium   


 


Potassium   


 


Chloride   


 


Carbon Dioxide   


 


Anion Gap   


 


BUN   


 


Creatinine   


 


Est GFR ( Amer)   


 


Est GFR (Non-Af Amer)   


 


POC Glucose (mg/dL)  231 H  175 H 


 


Random Glucose   


 


Calcium   


 


Total Bilirubin   


 


AST   


 


ALT   


 


Alkaline Phosphatase   


 


Total Protein   


 


Albumin   


 


Globulin   


 


Albumin/Globulin Ratio   














  10/25/18 10/25/18 10/25/18





  06:10 11:51 15:44


 


WBC   


 


RBC   


 


Hgb   


 


Hct   


 


MCV   


 


MCH   


 


MCHC   


 


RDW   


 


Plt Count   


 


Sodium  137  


 


Potassium  4.6  


 


Chloride  102  


 


Carbon Dioxide  22  


 


Anion Gap  18  


 


BUN  78 H  


 


Creatinine  2.1 H  


 


Est GFR ( Amer)  37  


 


Est GFR (Non-Af Amer)  31  


 


POC Glucose (mg/dL)   267 H  231 H


 


Random Glucose  199 H  


 


Calcium  9.1  


 


Total Bilirubin  2.1 H  


 


AST  18  


 


ALT  12 L D  


 


Alkaline Phosphatase  95  


 


Total Protein  7.5  


 


Albumin  3.9  


 


Globulin  3.6  


 


Albumin/Globulin Ratio  1.1  














Assessment & Plan


(1) Accidental fall


Status: Acute   





(2) CAD (coronary artery disease)


Status: Acute   





(3) Cerebral hemorrhage


Status: Acute   





(4) Cerebral hemorrhage


Assessment and Plan: 


plan for physical, occupational, rec and speech therapy covering for Dr Ho,


to write overall plan of care


Status: Acute   





(5) Cervical strain


Status: Acute   





(6) HTN (hypertension)


Status: Acute   





(7) Head contusion


Status: Acute   





(8) Lumbar strain


Status: Acute

## 2018-10-25 NOTE — CP.PCM.PN
Subjective





- Date & Time of Evaluation


Date of Evaluation: 10/25/18


Time of Evaluation: 13:00





- Subjective


Subjective: 





no acute complaints at present





Objective





- Vital Signs/Intake and Output


Vital Signs (last 24 hours): 


                                        











Temp Pulse Resp BP Pulse Ox


 


 97.7 F   71   22   124/63   96 


 


 10/25/18 08:30  10/25/18 09:18  10/25/18 08:30  10/25/18 09:18  10/25/18 08:54











- Medications


Medications: 


                               Current Medications





Carbidopa/Levodopa (Sinemet)  1 tab PO QID [facility]


   Last Admin: 10/25/18 17:20 Dose:  1 tab


Gabapentin (Neurontin)  100 mg PO Q12 [facility]


   Last Admin: 10/25/18 09:18 Dose:  100 mg


Insulin Detemir (Levemir)  35 units SC [facility]


Insulin Human Regular (Humulin R)  0 units SC ACCU-CHECK [facility]; Protocol


   Last Admin: 10/25/18 17:21 Dose:  4 units


Lactic Acid (Lac-Hydrin 12% Lotion (225 G))  1 applic TOP Q8 [facility]


   Last Admin: 10/25/18 13:30 Dose:  1 applic


Lactulose (Enulose)  10 gm PO DAILY PRN


   PRN Reason: Constipation


Lidocaine (Lidoderm)  1 ea TD 0900 [facility]


   Last Admin: 10/25/18 09:11 Dose:  1 ea


Lisinopril (Zestril)  2.5 mg PO DAILY [facility]


   Last Admin: 10/25/18 09:18 Dose:  2.5 mg


Multivitamins/Minerals (Therapeutic-M Tab)  1 tab PO DAILY [facility]


   Last Admin: 10/25/18 09:18 Dose:  1 tab


Oxycodone/Acetaminophen (Percocet 5/325 Mg Tab)  1 tab PO Q6 PRN


   PRN Reason: Pain, (4-10)


   Stop: 10/27/18 17:18


   Last Admin: 10/25/18 17:20 Dose:  1 tab


Sitagliptin Phosphate (Januvia)  100 mg PO DAILY [facility]


   Last Admin: 10/25/18 09:11 Dose:  100 mg











- Labs


Labs: 


                                        





                                 10/25/18 06:10 





                                 10/25/18 06:10 











- Constitutional


Appears: Well





- Eye Exam


Eye Exam: EOMI, Normal appearance


Pupil Exam: NORMAL ACCOMODATION, PERRL





- ENT Exam


ENT Exam: Mucous Membranes Moist, Normal Exam





- Neck Exam


Neck Exam: Normal Inspection





- Respiratory Exam


Respiratory Exam: Clear to Ausculation Bilateral, NORMAL BREATHING PATTERN





- Cardiovascular Exam


Cardiovascular Exam: REGULAR RHYTHM





- GI/Abdominal Exam


GI & Abdominal Exam: Soft, Normal Bowel Sounds





- Rectal Exam


Rectal Exam: NORMAL INSPECTION





-  Exam


External exam: NORMAL EXTERNAL EXAM





- Extremities Exam


Extremities Exam: Full ROM, Normal Capillary Refill, Normal Inspection





- Back Exam


Back Exam: NORMAL INSPECTION





- Neurological Exam


Neurological Exam: Alert, Awake


Neuro motor strength exam: Left Upper Extremity: 3, Right Upper Extremity: 3, 

Left Lower Extremity: 3, Right Lower Extremity: 3





- Psychiatric Exam


Psychiatric exam: Flat Affect





- Skin


Skin Exam: Normal Color





Assessment and Plan


(1) Accidental fall


Status: Acute   





(2) CAD (coronary artery disease)


Status: Acute   





(3) Cerebral hemorrhage


Status: Acute   





(4) Cerebral hemorrhage


Assessment & Plan: 


plan for physical, occupational, rec and speech therapy  range of motion, stren

ghtening, transfers and gait training  covering for Dr Ho


Status: Acute   





(5) Cervical strain


Status: Acute   





(6) HTN (hypertension)


Status: Acute   





(7) Head contusion


Status: Acute   





(8) Lumbar strain


Status: Acute

## 2018-10-26 RX ADMIN — LACTULOSE PRN GM: 10 SOLUTION ORAL at 09:01

## 2018-10-26 RX ADMIN — INSULIN DETEMIR SCH UNITS: 100 INJECTION, SOLUTION SUBCUTANEOUS at 21:48

## 2018-10-26 RX ADMIN — CARBIDOPA AND LEVODOPA SCH TAB: 25; 250 TABLET ORAL at 12:31

## 2018-10-26 RX ADMIN — OXYCODONE HYDROCHLORIDE AND ACETAMINOPHEN PRN TAB: 5; 325 TABLET ORAL at 22:02

## 2018-10-26 RX ADMIN — Medication SCH TAB: at 09:00

## 2018-10-26 RX ADMIN — CARBIDOPA AND LEVODOPA SCH TAB: 25; 250 TABLET ORAL at 21:48

## 2018-10-26 RX ADMIN — CARBIDOPA AND LEVODOPA SCH TAB: 25; 250 TABLET ORAL at 17:33

## 2018-10-26 RX ADMIN — OXYCODONE HYDROCHLORIDE AND ACETAMINOPHEN PRN TAB: 5; 325 TABLET ORAL at 08:59

## 2018-10-26 RX ADMIN — CARBIDOPA AND LEVODOPA SCH TAB: 25; 250 TABLET ORAL at 09:00

## 2018-10-26 NOTE — CP.PCM.PN
Subjective





- Date & Time of Evaluation


Date of Evaluation: 10/26/18


Time of Evaluation: 11:00





- Subjective


Subjective: 





no acute complaints at present





Objective





- Vital Signs/Intake and Output


Vital Signs (last 24 hours): 


                                        











Temp Pulse Resp BP Pulse Ox


 


 97.7 F   55 L  20   133/56 L  95 


 


 10/26/18 08:59  10/26/18 09:00  10/26/18 08:59  10/26/18 09:00  10/26/18 08:59











- Medications


Medications: 


                               Current Medications





Carbidopa/Levodopa (Sinemet)  1 tab PO QID [facility]


   Last Admin: 10/26/18 12:31 Dose:  1 tab


Gabapentin (Neurontin)  100 mg PO Q12 [facility]


   Last Admin: 10/26/18 09:00 Dose:  100 mg


Insulin Detemir (Levemir)  35 units SC HS [facility]


   Last Admin: 10/25/18 21:35 Dose:  35 units


Insulin Human Regular (Humulin R)  0 units SC ACCU-CHECK [facility]; Protocol


   Last Admin: 10/26/18 12:31 Dose:  2 units


Lactic Acid (Lac-Hydrin 12% Lotion (225 G))  1 applic TOP Q8 [facility]


   Last Admin: 10/26/18 13:39 Dose:  1 applic


Lactulose (Enulose)  10 gm PO DAILY PRN


   PRN Reason: Constipation


   Last Admin: 10/26/18 09:01 Dose:  10 gm


Lidocaine (Lidoderm)  1 ea TD 0900 [facility]


   Last Admin: 10/26/18 09:01 Dose:  1 ea


Lisinopril (Zestril)  2.5 mg PO DAILY [facility]


   Last Admin: 10/26/18 09:00 Dose:  2.5 mg


Multivitamins/Minerals (Therapeutic-M Tab)  1 tab PO DAILY [facility]


   Last Admin: 10/26/18 09:00 Dose:  1 tab


Oxycodone/Acetaminophen (Percocet 5/325 Mg Tab)  1 tab PO Q6 PRN


   PRN Reason: Pain, (4-10)


   Stop: 10/27/18 17:18


   Last Admin: 10/26/18 08:59 Dose:  1 tab


Sitagliptin Phosphate (Januvia)  100 mg PO DAILY [facility]


   Last Admin: 10/26/18 09:01 Dose:  100 mg











- Labs


Labs: 


                                        





                                 10/25/18 06:10 





                                 10/25/18 06:10 











- Head Exam


Head Exam: ATRAUMATIC, NORMAL INSPECTION, NORMOCEPHALIC





- Eye Exam


Eye Exam: EOMI, Normal appearance, PERRL


Pupil Exam: NORMAL ACCOMODATION





- ENT Exam


ENT Exam: Mucous Membranes Moist, Normal Exam





- Neck Exam


Neck Exam: Full ROM, Normal Inspection





- Respiratory Exam


Respiratory Exam: Clear to Ausculation Bilateral, NORMAL BREATHING PATTERN





- Cardiovascular Exam


Cardiovascular Exam: REGULAR RHYTHM





- GI/Abdominal Exam


GI & Abdominal Exam: Soft, Normal Bowel Sounds





- Rectal Exam


Rectal Exam: NORMAL INSPECTION





-  Exam


External exam: NORMAL EXTERNAL EXAM





- Extremities Exam


Extremities Exam: Full ROM, Normal Capillary Refill





- Back Exam


Back Exam: NORMAL INSPECTION





- Neurological Exam


Neurological Exam: Alert, Awake


Neuro motor strength exam: Left Upper Extremity: 3, Right Upper Extremity: 3, 

Left Lower Extremity: 3, Right Lower Extremity: 3





- Psychiatric Exam


Psychiatric exam: Normal Affect, Normal Mood





- Skin


Skin Exam: Dry





Assessment and Plan


(1) Accidental fall


Status: Acute   





(2) CAD (coronary artery disease)


Status: Acute   





(3) Cerebral hemorrhage


Status: Acute   





(4) Cerebral hemorrhage


Assessment & Plan: 


plan for physical, occupational and rec therapy


Status: Acute   





(5) Cervical strain


Status: Acute   





(6) HTN (hypertension)


Status: Acute   





(7) Head contusion


Status: Acute   





(8) Lumbar strain


Status: Acute

## 2018-10-27 LAB
BILIRUB UR-MCNC: NEGATIVE MG/DL
COLOR UR: YELLOW
GLUCOSE UR STRIP-MCNC: (no result) MG/DL
LEUKOCYTE ESTERASE UR-ACNC: (no result) LEU/UL
PH UR STRIP: 5 [PH] (ref 5–8)
PROT UR STRIP-MCNC: NEGATIVE MG/DL
RBC # UR STRIP: NEGATIVE /UL
SP GR UR STRIP: 1.02 (ref 1–1.03)
SQUAMOUS EPITHIAL: < 1 /HPF (ref 0–5)
URINE CLARITY: CLEAR
UROBILINOGEN UR-MCNC: (no result) MG/DL (ref 0.2–1)

## 2018-10-27 RX ADMIN — INSULIN DETEMIR SCH UNITS: 100 INJECTION, SOLUTION SUBCUTANEOUS at 21:54

## 2018-10-27 RX ADMIN — OXYCODONE HYDROCHLORIDE AND ACETAMINOPHEN PRN TAB: 5; 325 TABLET ORAL at 08:34

## 2018-10-27 RX ADMIN — CARBIDOPA AND LEVODOPA SCH TAB: 25; 250 TABLET ORAL at 08:35

## 2018-10-27 RX ADMIN — Medication SCH TAB: at 08:36

## 2018-10-27 RX ADMIN — LACTULOSE PRN GM: 10 SOLUTION ORAL at 08:35

## 2018-10-27 RX ADMIN — CARBIDOPA AND LEVODOPA SCH TAB: 25; 250 TABLET ORAL at 17:08

## 2018-10-27 RX ADMIN — CARBIDOPA AND LEVODOPA SCH TAB: 25; 250 TABLET ORAL at 13:17

## 2018-10-27 RX ADMIN — CARBIDOPA AND LEVODOPA SCH TAB: 25; 250 TABLET ORAL at 21:54

## 2018-10-28 RX ADMIN — CARBIDOPA AND LEVODOPA SCH TAB: 25; 250 TABLET ORAL at 08:56

## 2018-10-28 RX ADMIN — CARBIDOPA AND LEVODOPA SCH TAB: 25; 250 TABLET ORAL at 21:38

## 2018-10-28 RX ADMIN — INSULIN DETEMIR SCH UNITS: 100 INJECTION, SOLUTION SUBCUTANEOUS at 21:40

## 2018-10-28 RX ADMIN — CARBIDOPA AND LEVODOPA SCH TAB: 25; 250 TABLET ORAL at 17:15

## 2018-10-28 RX ADMIN — Medication SCH TAB: at 08:56

## 2018-10-28 RX ADMIN — CARBIDOPA AND LEVODOPA SCH TAB: 25; 250 TABLET ORAL at 13:02

## 2018-10-29 LAB
ALBUMIN SERPL-MCNC: 4.2 G/DL (ref 3.5–5)
ALBUMIN/GLOB SERPL: 1.1 {RATIO} (ref 1–2.1)
ALT SERPL-CCNC: 14 U/L (ref 21–72)
AST SERPL-CCNC: 21 U/L (ref 17–59)
BUN SERPL-MCNC: 52 MG/DL (ref 9–20)
CALCIUM SERPL-MCNC: 9.4 MG/DL (ref 8.4–10.2)
GFR NON-AFRICAN AMERICAN: 53

## 2018-10-29 RX ADMIN — IPRATROPIUM BROMIDE AND ALBUTEROL SULFATE SCH ML: .5; 3 SOLUTION RESPIRATORY (INHALATION) at 19:50

## 2018-10-29 RX ADMIN — CARBIDOPA AND LEVODOPA SCH TAB: 25; 250 TABLET ORAL at 21:31

## 2018-10-29 RX ADMIN — Medication SCH TAB: at 08:28

## 2018-10-29 RX ADMIN — CARBIDOPA AND LEVODOPA SCH TAB: 25; 250 TABLET ORAL at 13:16

## 2018-10-29 RX ADMIN — INSULIN DETEMIR SCH UNITS: 100 INJECTION, SOLUTION SUBCUTANEOUS at 21:30

## 2018-10-29 RX ADMIN — IPRATROPIUM BROMIDE AND ALBUTEROL SULFATE SCH: .5; 3 SOLUTION RESPIRATORY (INHALATION) at 14:53

## 2018-10-29 RX ADMIN — CARBIDOPA AND LEVODOPA SCH TAB: 25; 250 TABLET ORAL at 16:49

## 2018-10-29 RX ADMIN — CARBIDOPA AND LEVODOPA SCH TAB: 25; 250 TABLET ORAL at 08:27

## 2018-10-29 RX ADMIN — OXYCODONE HYDROCHLORIDE AND ACETAMINOPHEN PRN TAB: 5; 325 TABLET ORAL at 11:31

## 2018-10-29 NOTE — CP.PCM.PN
Subjective





- Date & Time of Evaluation


Date of Evaluation: 10/27/18


Time of Evaluation: 19:00





- Subjective


Subjective: 





no acute complaints at present





Objective





- Vital Signs/Intake and Output


Vital Signs (last 24 hours): 


                                        











Temp Pulse Resp BP Pulse Ox


 


 97.5 F L  69   19   145/75   100 


 


 10/29/18 08:19  10/29/18 08:19  10/29/18 08:19  10/29/18 08:28  10/29/18 08:19











- Medications


Medications: 


                               Current Medications





Carbidopa/Levodopa (Sinemet)  1 tab PO QID [facility]


   Last Admin: 10/29/18 08:27 Dose:  1 tab


Gabapentin (Neurontin)  100 mg PO Q12 [facility]


   Last Admin: 10/29/18 08:28 Dose:  100 mg


Home Med (Patient's Own Medication)  1 unit PO DAILY [facility]


   Last Admin: 10/29/18 08:27 Dose:  1 unit


Insulin Detemir (Levemir)  35 units SC [facility]


   Last Admin: 10/28/18 21:40 Dose:  35 units


Insulin Human Regular (Humulin R)  0 units SC ACCU-CHECK [facility]; Protocol


   Last Admin: 10/29/18 06:23 Dose:  Not Given


Lactic Acid (Lac-Hydrin 12% Lotion (225 G))  1 applic TOP Q8 [facility]


   Last Admin: 10/29/18 06:21 Dose:  1 applic


Lactulose (Enulose)  10 gm PO DAILY PRN


   PRN Reason: Constipation


   Last Admin: 10/27/18 08:35 Dose:  10 gm


Lidocaine (Lidoderm)  1 ea TD 0900 [facility]


   Last Admin: 10/29/18 08:29 Dose:  1 ea


Lisinopril (Zestril)  2.5 mg PO DAILY [facility]


   Last Admin: 10/29/18 08:28 Dose:  2.5 mg


Multivitamins/Minerals (Therapeutic-M Tab)  1 tab PO DAILY [facility]


   Last Admin: 10/29/18 08:28 Dose:  1 tab


Oxycodone/Acetaminophen (Percocet 5/325 Mg Tab)  1 tab PO Q6 PRN


   PRN Reason: Pain, (4-10)


   Stop: 11/01/18 08:48


   Last Admin: 10/29/18 11:31 Dose:  1 tab


Sitagliptin Phosphate (Januvia)  100 mg PO DAILY [facility]


   Last Admin: 10/29/18 08:27 Dose:  100 mg











- Labs


Labs: 


                                        





                                 10/25/18 06:10 





                                 10/29/18 09:40 











- Constitutional


Appears: Well





- Head Exam


Head Exam: ATRAUMATIC, NORMAL INSPECTION, NORMOCEPHALIC





- Eye Exam


Eye Exam: EOMI, Normal appearance, PERRL


Pupil Exam: NORMAL ACCOMODATION, PERRL





- ENT Exam


ENT Exam: Mucous Membranes Moist, Normal Exam





- Neck Exam


Neck Exam: Normal Inspection





- Respiratory Exam


Respiratory Exam: Clear to Ausculation Bilateral





- Cardiovascular Exam


Cardiovascular Exam: REGULAR RHYTHM





- GI/Abdominal Exam


GI & Abdominal Exam: Soft, Normal Bowel Sounds





- Rectal Exam


Rectal Exam: NORMAL INSPECTION





-  Exam


External exam: NORMAL EXTERNAL EXAM





- Extremities Exam


Extremities Exam: Normal Capillary Refill, Normal Inspection





- Back Exam


Back Exam: NORMAL INSPECTION





- Neurological Exam


Neurological Exam: Alert, Awake





- Psychiatric Exam


Psychiatric exam: Normal Affect





- Skin


Skin Exam: Dry, Normal Color





Assessment and Plan


(1) Accidental fall


Status: Acute   





(2) CAD (coronary artery disease)


Status: Acute   





(3) Cerebral hemorrhage


Status: Acute   





(4) Cerebral hemorrhage


Assessment & Plan: 


to continue with present therapy program, pt, ot rce and St.


Status: Acute   





(5) Cervical strain


Status: Acute   





(6) HTN (hypertension)


Status: Acute   





(7) Head contusion


Status: Acute   





(8) Lumbar strain


Status: Acute

## 2018-10-29 NOTE — CP.PCM.PN
Subjective





- Date & Time of Evaluation


Date of Evaluation: 10/26/18


Time of Evaluation: 10:00





- Subjective


Subjective: 





patient is doing a lot better


 Has no chest pain or SOB


 Has less pain on the left rib cage area.





Objective





- Vital Signs/Intake and Output


Vital Signs (last 24 hours): 


                                        











Temp Pulse Resp BP Pulse Ox


 


 97.5 F L  69   19   145/75   100 


 


 10/29/18 08:19  10/29/18 08:19  10/29/18 08:19  10/29/18 08:28  10/29/18 08:19











- Medications


Medications: 


                               Current Medications





Carbidopa/Levodopa (Sinemet)  1 tab PO QID [facility]


   Last Admin: 10/29/18 08:27 Dose:  1 tab


Gabapentin (Neurontin)  100 mg PO Q12 [facility]


   Last Admin: 10/29/18 08:28 Dose:  100 mg


Home Med (Patient's Own Medication)  1 unit PO DAILY [facility]


   Last Admin: 10/29/18 08:27 Dose:  1 unit


Insulin Detemir (Levemir)  35 units SC HS [facility]


   Last Admin: 10/28/18 21:40 Dose:  35 units


Insulin Human Regular (Humulin R)  0 units SC ACCU-CHECK [facility]; Protocol


   Last Admin: 10/29/18 06:23 Dose:  Not Given


Lactic Acid (Lac-Hydrin 12% Lotion (225 G))  1 applic TOP Q8 [facility]


   Last Admin: 10/29/18 06:21 Dose:  1 applic


Lactulose (Enulose)  10 gm PO DAILY PRN


   PRN Reason: Constipation


   Last Admin: 10/27/18 08:35 Dose:  10 gm


Lidocaine (Lidoderm)  1 ea TD 0900 [facility]


   Last Admin: 10/29/18 08:29 Dose:  1 ea


Lisinopril (Zestril)  2.5 mg PO DAILY [facility]


   Last Admin: 10/29/18 08:28 Dose:  2.5 mg


Multivitamins/Minerals (Therapeutic-M Tab)  1 tab PO DAILY [facility]


   Last Admin: 10/29/18 08:28 Dose:  1 tab


Oxycodone/Acetaminophen (Percocet 5/325 Mg Tab)  1 tab PO Q6 PRN


   PRN Reason: Pain, (4-10)


   Stop: 10/29/18 19:08


   Last Admin: 10/27/18 08:34 Dose:  1 tab


Sitagliptin Phosphate (Januvia)  100 mg PO DAILY [facility]


   Last Admin: 10/29/18 08:27 Dose:  100 mg











- Labs


Labs: 


                                        





                                 10/25/18 06:10 





                                 10/25/18 06:10 











- Head Exam


Head Exam: NORMAL INSPECTION





- Eye Exam


Eye Exam: Normal appearance





- Respiratory Exam


Respiratory Exam: Clear to Ausculation Bilateral





- Cardiovascular Exam


Cardiovascular Exam: REGULAR RHYTHM





- GI/Abdominal Exam


GI & Abdominal Exam: Normal Bowel Sounds





Assessment and Plan


(1) Cerebral hemorrhage


Status: Acute   





(2) Diabetes mellitus type 2 in nonobese


Status: Acute   





(3) Accidental fall


Status: Acute   





(4) CAD (coronary artery disease)


Status: Acute   





(5) HTN (hypertension)


Status: Acute   





(6) Rib fractures


Status: Acute   





(7) Parkinson disease


Status: Acute   





- Assessment and Plan (Free Text)


Plan: 





Cont meds


Pain meds


 cont Pt

## 2018-10-29 NOTE — CP.PCM.HP
History of Present Illness





- History of Present Illness


History of Present Illness: 





This is an 79 y/o male with hx of DM 2, Parkinsons  who had a fall at home and 

sustained multiple left side rib fractures


and noted to have cerebral hemorrhage.


he was kept at telemetry unit and was maintained on pain meds. Neurology and PT 

was called, Repeat imaging revealed no 


progression of hemorrhage.


He was evaluated and suggested acute rehab.





Present on Admission





- Present on Admission


Any Indicators Present on Admission: No


History of DVT/PE: No


History of Uncontrolled Diabetes: Yes


Urinary Catheter: No


Decubitus Ulcer Present: No





Review of Systems





- Musculoskeletal


Musculoskeletal: Abnormal Gait, Arthralgias





- Neurological


Neurological: Abnormal Gait, Disequilibrium, Frequent Falls





Past Patient History





- Past Medical History & Family History


Past Medical History?: Yes





- Past Social History


Smoking Status: Never Smoked





- CARDIAC


Hx Cardiac Disorders: Yes


Hx Hypertension: Yes





- PULMONARY


Hx Respiratory Disorders: No





- NEUROLOGICAL


Hx Parkinson's Disease: Yes


Hx Syncope: Yes





- HEENT


Hx HEENT Problems: No


Other/Comment: uses eye glasses for reading





- RENAL


Hx Chronic Kidney Disease: No





- ENDOCRINE/METABOLIC


Hx Diabetes Mellitus Type 2: Yes





- HEMATOLOGICAL/ONCOLOGICAL


Hx AIDS: No


Hx Human Immunodeficiency Virus (HIV): No





- INTEGUMENTARY


Hx Dermatological Problems: No





- MUSCULOSKELETAL/RHEUMATOLOGICAL


Hx Falls: Yes


Hx Fractures: Yes





- GASTROINTESTINAL


Hx Gastrointestinal Disorders: No





- GENITOURINARY/GYNECOLOGICAL


Hx Genitourinary Disorders: No





- PSYCHIATRIC


Hx Psychophysiologic Disorder: No


Hx Substance Use: No





- SURGICAL HISTORY


Hx Coronary Artery Bypass Graft: Yes





- ANESTHESIA


Hx Anesthesia: Yes


Hx Anesthesia Reactions: No


Hx Malignant Hyperthermia: No


Has any member of the family had a problem w/ anesthesia?: No





Meds


Allergies/Adverse Reactions: 


                                    Allergies











Allergy/AdvReac Type Severity Reaction Status Date / Time


 


No Known Allergies Allergy   Verified 01/08/16 13:05














Physical Exam





- Head Exam


Head Exam: NORMAL INSPECTION





- Eye Exam


Eye Exam: Normal appearance





- Respiratory Exam


Respiratory Exam: Clear to Auscultation Bilateral





- Cardiovascular Exam


Cardiovascular Exam: REGULAR RHYTHM





- GI/Abdominal Exam


GI & Abdominal Exam: Normal Bowel Sounds





- Neurological Exam


Neurological exam: Alert, CN II-XII Intact





- Additional Findings


Additional findings: 





tenderness on left side of chest wall from multiple rib fractures.





Results





- Vital Signs


Recent Vital Signs: 





                                Last Vital Signs











Temp  97.5 F L  10/29/18 08:19


 


Pulse  69   10/29/18 08:19


 


Resp  19   10/29/18 08:19


 


BP  145/75   10/29/18 08:28


 


Pulse Ox  100   10/29/18 08:19














- Labs


Result Diagrams: 


                                 10/25/18 06:10





                                 10/25/18 06:10





Assessment & Plan


(1) Cerebral hemorrhage


Status: Acute   





(2) Diabetes mellitus type 2 in nonobese


Status: Acute   





(3) Accidental fall


Status: Acute   





(4) CAD (coronary artery disease)


Status: Acute   





(5) HTN (hypertension)


Status: Acute   





(6) Rib fractures


Status: Acute   





(7) Parkinson disease


Status: Acute   





- Assessment and Plan (Free Text)


Plan: 





start phys therapy


 pain meds


 gait and balance training


 neuro follow up


 adjust DM meds.

## 2018-10-29 NOTE — CP.PCM.PN
Subjective





- Date & Time of Evaluation


Date of Evaluation: 10/27/18


Time of Evaluation: 18:00





- Subjective


Subjective: 





Patient remains stable


 Has minimal pain on the fracture site


 Doing well with PT





Objective





- Vital Signs/Intake and Output


Vital Signs (last 24 hours): 


                                        











Temp Pulse Resp BP Pulse Ox


 


 97.5 F L  69   19   145/75   100 


 


 10/29/18 08:19  10/29/18 08:19  10/29/18 08:19  10/29/18 08:28  10/29/18 08:19











- Medications


Medications: 


                               Current Medications





Carbidopa/Levodopa (Sinemet)  1 tab PO QID [facility]


   Last Admin: 10/29/18 08:27 Dose:  1 tab


Gabapentin (Neurontin)  100 mg PO Q12 [facility]


   Last Admin: 10/29/18 08:28 Dose:  100 mg


Home Med (Patient's Own Medication)  1 unit PO DAILY [facility]


   Last Admin: 10/29/18 08:27 Dose:  1 unit


Insulin Detemir (Levemir)  35 units SC HS [facility]


   Last Admin: 10/28/18 21:40 Dose:  35 units


Insulin Human Regular (Humulin R)  0 units SC ACCU-CHECK [facility]; Protocol


   Last Admin: 10/29/18 06:23 Dose:  Not Given


Lactic Acid (Lac-Hydrin 12% Lotion (225 G))  1 applic TOP Q8 [facility]


   Last Admin: 10/29/18 06:21 Dose:  1 applic


Lactulose (Enulose)  10 gm PO DAILY [facility]N


   PRN Reason: Constipation


   Last Admin: 10/27/18 08:35 Dose:  10 gm


Lidocaine (Lidoderm)  1 ea TD 0900 [facility]


   Last Admin: 10/29/18 08:29 Dose:  1 ea


Lisinopril (Zestril)  2.5 mg PO DAILY [facility]


   Last Admin: 10/29/18 08:28 Dose:  2.5 mg


Multivitamins/Minerals (Therapeutic-M Tab)  1 tab PO DAILY [facility]


   Last Admin: 10/29/18 08:28 Dose:  1 tab


Oxycodone/Acetaminophen (Percocet 5/325 Mg Tab)  1 tab PO Q6 PRN


   PRN Reason: Pain, (4-10)


   Stop: 10/29/18 19:08


   Last Admin: 10/27/18 08:34 Dose:  1 tab


Sitagliptin Phosphate (Januvia)  100 mg PO DAILY [facility]


   Last Admin: 10/29/18 08:27 Dose:  100 mg











- Labs


Labs: 


                                        





                                 10/25/18 06:10 





                                 10/25/18 06:10 











- Head Exam


Head Exam: NORMAL INSPECTION





- Eye Exam


Eye Exam: Normal appearance





- Respiratory Exam


Respiratory Exam: Clear to Ausculation Bilateral





- Cardiovascular Exam


Cardiovascular Exam: REGULAR RHYTHM





- GI/Abdominal Exam


GI & Abdominal Exam: Normal Bowel Sounds





Assessment and Plan


(1) Cerebral hemorrhage


Status: Acute   





(2) Diabetes mellitus type 2 in nonobese


Status: Acute   





(3) Accidental fall


Status: Acute   





(4) CAD (coronary artery disease)


Status: Acute   





(5) HTN (hypertension)


Status: Acute   





(6) Rib fractures


Status: Acute   





(7) Parkinson disease


Status: Acute   





- Assessment and Plan (Free Text)


Plan: 





Cont meds


pain meds


 cont PT


 stool softener


 adjust meds for DM

## 2018-10-29 NOTE — RAD
Date of service: 



10/29/2018



HISTORY:

 Wheezing 



COMPARISON:

10/20/2018.



10/20/2018 CT thorax 



TECHNIQUE:

Chest PA and lateral



FINDINGS:



LUNGS:

Stable atelectatic changes/scarring in both lower lobes.



PLEURA:

No significant pleural effusion identified. No pneumothorax apparent.



CARDIOVASCULAR:

No aortic atherosclerotic calcification present.



No radiographic findings to suggest acute or significant 

cardiovascular disease. Incidental Finding(s): Postoperative changes 

related to sternotomy.  No pulmonary vascular congestion. 



OSSEOUS STRUCTURES:

No significant abnormalities.



VISUALIZED UPPER ABDOMEN:

Normal.



OTHER FINDINGS:

None.



IMPRESSION:

No significant interval change compared to the prior examination(s).

## 2018-10-29 NOTE — CP.PCM.PN
Subjective





- Date & Time of Evaluation


Date of Evaluation: 10/28/18


Time of Evaluation: 18:00





- Subjective


Subjective: 





patient remains stable


 Has been doing well with PT


Has good sleep


 Has no headaches.





Objective





- Vital Signs/Intake and Output


Vital Signs (last 24 hours): 


                                        











Temp Pulse Resp BP Pulse Ox


 


 97.5 F L  69   19   145/75   100 


 


 10/29/18 08:19  10/29/18 08:19  10/29/18 08:19  10/29/18 08:28  10/29/18 08:19











- Medications


Medications: 


                               Current Medications





Carbidopa/Levodopa (Sinemet)  1 tab PO QID [facility]


   Last Admin: 10/29/18 08:27 Dose:  1 tab


Gabapentin (Neurontin)  100 mg PO Q12 [facility]


   Last Admin: 10/29/18 08:28 Dose:  100 mg


Home Med (Patient's Own Medication)  1 unit PO DAILY [facility]


   Last Admin: 10/29/18 08:27 Dose:  1 unit


Insulin Detemir (Levemir)  35 units SC HS [facility]


   Last Admin: 10/28/18 21:40 Dose:  35 units


Insulin Human Regular (Humulin R)  0 units SC ACCU-CHECK [facility]; Protocol


   Last Admin: 10/29/18 06:23 Dose:  Not Given


Lactic Acid (Lac-Hydrin 12% Lotion (225 G))  1 applic TOP Q8 [facility]


   Last Admin: 10/29/18 06:21 Dose:  1 applic


Lactulose (Enulose)  10 gm PO DAILY [facility]N


   PRN Reason: Constipation


   Last Admin: 10/27/18 08:35 Dose:  10 gm


Lidocaine (Lidoderm)  1 ea TD 0900 [facility]


   Last Admin: 10/29/18 08:29 Dose:  1 ea


Lisinopril (Zestril)  2.5 mg PO DAILY [facility]


   Last Admin: 10/29/18 08:28 Dose:  2.5 mg


Multivitamins/Minerals (Therapeutic-M Tab)  1 tab PO DAILY [facility]


   Last Admin: 10/29/18 08:28 Dose:  1 tab


Oxycodone/Acetaminophen (Percocet 5/325 Mg Tab)  1 tab PO Q6 PRN


   PRN Reason: Pain, (4-10)


   Stop: 10/29/18 19:08


   Last Admin: 10/27/18 08:34 Dose:  1 tab


Sitagliptin Phosphate (Januvia)  100 mg PO DAILY [facility]


   Last Admin: 10/29/18 08:27 Dose:  100 mg











- Labs


Labs: 


                                        





                                 10/25/18 06:10 





                                 10/25/18 06:10 











- Head Exam


Head Exam: NORMAL INSPECTION





- Eye Exam


Eye Exam: Normal appearance





- Respiratory Exam


Respiratory Exam: Clear to Ausculation Bilateral





- Cardiovascular Exam


Cardiovascular Exam: REGULAR RHYTHM





- GI/Abdominal Exam


GI & Abdominal Exam: Normal Bowel Sounds





- Neurological Exam


Neurological Exam: Awake, Oriented x3





Assessment and Plan


(1) Cerebral hemorrhage


Status: Acute   





(2) Diabetes mellitus type 2 in nonobese


Status: Acute   





(3) Accidental fall


Status: Acute   





(4) CAD (coronary artery disease)


Status: Acute   





(5) HTN (hypertension)


Status: Acute   





(6) Rib fractures


Status: Acute   





(7) Parkinson disease


Status: Acute   





- Assessment and Plan (Free Text)


Plan: 





Cont meds


Cont tx


Cont PT


pain meds

## 2018-10-30 RX ADMIN — CARBIDOPA AND LEVODOPA SCH TAB: 25; 250 TABLET ORAL at 16:56

## 2018-10-30 RX ADMIN — GUAIFENESIN SCH MG: 600 TABLET, EXTENDED RELEASE ORAL at 13:14

## 2018-10-30 RX ADMIN — CARBIDOPA AND LEVODOPA SCH TAB: 25; 250 TABLET ORAL at 08:39

## 2018-10-30 RX ADMIN — IPRATROPIUM BROMIDE AND ALBUTEROL SULFATE SCH ML: .5; 3 SOLUTION RESPIRATORY (INHALATION) at 13:15

## 2018-10-30 RX ADMIN — CARBIDOPA AND LEVODOPA SCH TAB: 25; 250 TABLET ORAL at 13:14

## 2018-10-30 RX ADMIN — GUAIFENESIN SCH MG: 600 TABLET, EXTENDED RELEASE ORAL at 21:09

## 2018-10-30 RX ADMIN — CARBIDOPA AND LEVODOPA SCH TAB: 25; 250 TABLET ORAL at 21:18

## 2018-10-30 RX ADMIN — Medication SCH TAB: at 08:40

## 2018-10-30 RX ADMIN — IPRATROPIUM BROMIDE AND ALBUTEROL SULFATE SCH ML: .5; 3 SOLUTION RESPIRATORY (INHALATION) at 19:12

## 2018-10-30 RX ADMIN — INSULIN DETEMIR SCH UNITS: 100 INJECTION, SOLUTION SUBCUTANEOUS at 21:21

## 2018-10-30 RX ADMIN — IPRATROPIUM BROMIDE AND ALBUTEROL SULFATE SCH: .5; 3 SOLUTION RESPIRATORY (INHALATION) at 07:51

## 2018-10-30 NOTE — PCM.PSYTMC
Acute Rehab Team Conference -





- Vital Signs:


Vital Signs (Last 8 Hours): 


                                   Vital Signs











  10/30/18 10/30/18 10/30/18





  08:32 08:39 08:58


 


Temperature 97.9 F  97.9 F


 


Pulse Rate 75  75


 


Respiratory 19  19





Rate   


 


Blood Pressure 124/83 124/83 124/83


 


O2 Sat by Pulse 96  





Oximetry   











Pain: 0





- Precautions:


Precautions: Fall Prevention





- Medications/Other Issues:


Comment: - (+) Wheezing during therapy plus tends to desaturate during 

acitivities. Dr. Cespedes aware, CXR done, no changes. Started on Neb Tx Duoneb 

and Incentive Spirometry.  - Educated on deep breathing and coughing exercises .





- Consults:


Comment: Dr. Ho





- Toileting:


Toileting: Dependent





- Bladder Management:


Bladder Pattern: Normal


Voiding Method: Toilet, Urinal


Bladder Management: Supervision


Other Intervention:: 0





- Transfers:


Transfers: Maximal Assistance





- ADL's:


ADL's: Maximal Assistance





- Pain Management:


Other Intervention:: Percocet PRN, Lidoderm Patch daily.





- Patient/Family Teaching:


Other Intervention:: Care post TBI, Safety Precations, Pain management, Deep 

breathing and coughing exercises





- Goals/Time Frame:


Comment: Per multidisciplinary care plan and goals





- Provider:


Registered Nurse:: Cori Farmer





Physical Therapy





- Bed Mobility


Bed Mobility: Moderate Assistance





- Transfers


Wheelchair to Mat: Moderate Assistance


Sit to Stand: Moderate Assistance





- Ambulation


Level of Assistance: Verbal Cues, Contact Guard


Distance (ft.): 125


Assistive Devices: Rolling Walker





- Stair Negotiation


Stairs: Level of Assistance: Not Tested





- Standing Balance


Static Stand: Supervision





- Pain


Pain (assessed during therapy session): 8


Comment: L ribs





- Insight/Carryover


Insight/Carryover: Fair





- Patient/Family Education


Comment: safety, deep breathing exercises





- Assessment/Plan


Assessment: Pt participated in 90 minute PT tx session focusing on BLE 

strengthening exercises, balance and endurance activites, and functional 

mobility training. New MD order to place pt on supplemental O2 if SpO2 < 93%. 

Educated pt on deep breathing exercises throughout session. Pt able to ambulate 

wtih RW and CGA, requires mod A for transfers . Pt limited by L rib pain. Pt 

will continue to benefit from skilled PT intervention to address deficits, 

reduce fall risk, and maximize functional independence.





- Goals


Tiimeframe: 3 weeks


Goals: Sit < > supine with S.  Sit < > stand transfers with RW and S.  Pt will 

ambulate 150 ft with RW and S





- Provider


Physical Therapist:: Preeti Madera


License Number:: [id number]





Occupational Therapy





- Arousal/Attention/Orientation


Level of Consciousness: Awake, Alert


Patient Orientation: Person, Place, Time


Assessment Comment: -impaired insight, judgement regarding limitations & 

potential fall risk.  -impaired safety awareness.  -increased overall pain in L 

rib, back and B knees.  -increased SOB & wheezing and desaturation with 

activity--to 83-84% on room air, at rest 92-97 % fluctuates





- ADL/IADL


Self Feeding: Independent, Set-up Help


Grooming: Supervision, Verbal Cues, Set-up Help


Dressing-Upper Ext: Supervision, Verbal Cues, Set-up Help, Minimal Assistance


Dressing-Lower Ext: Verbal Cues, Set-up Help, Maximum Assistance


Comment: functional status varies with pain level, respiratory function, 

rigidity & overall energy level





- Sitting Balance


Static Sitting: Supervision


Dynamic Sitting: Reaches across midline, Reaches out of base of support, Reaches

within base of support, Minimal Assistance


Comment: seated unsuppported at edge of bed more so due to increase L rib/back 

pain & SOB/wheezing





- Transfers


Wheelchair to Bed Transfers: Verbal Cues, Set-up Help, Moderate Assistance


Toilet Transfers: Verbal Cues, Set-up Help, Moderate Assistance


Comment: constant verbal/tactile cues to initiate, to engage, to flex @ trunk, 

place BLES/BUES properly for safety





- Wheelchair Management


Level of Assistance: Verbal Cues, Set-up Help, Minimal Assistance


Distance (ft.): 100





- Upper Extremity Status


Right Upper Extremity Comment: AROM is WFLs


Left Upper Extremity Comment: AROM is WFLS





- Pain


Pain (assessed during therapy session): 9


Alleviating Techniques: Medication, Distraction, Inactivity


Comment: -low back.  -L rib area.  -B knees.  Pt levl to 7/10 after pain 

meds--percocet





- Insight/Carryover


Insight/Carryover: Fair





- Patient/Family Education


Comment: -adls/transfers/mobility training--further training needed.  -caregiver

ed re: pt's limitation, goals and likely need 24 hour care upon dc from acute 

rehab.  -w/c propulsion/management.  -rehab/OT goals, plan of care.  -encourage 

adl participation, toileting.  -review use of call bell for safety





- Assessment/Plan


Assessment: Pt is a 78 year old bilingual(Cameroonian/English) male with dx: 

intracranial hemorrhage.  *Precautions:  falls(w/c alarm), cardiac, impaired 

cognition/memory, impaired respiratory funtions(rib pain/wheezing).  Pt limited 

by impaired insight/judgement/memory, impaired postural control, impaired 

standing/sitting postural alignment/balance/tolerance, impaired activtity 

tolerance, ++L rib pain/back & B knee pain, impaired safety awareness, impaired 

knowledge of adaptive/compensatory strategies, impaired respiratory function & 

impaired overall strength --which greatly impact on function in self care, 

transfers & mobility. Pt with increased pain in B knees, low back, L rib/torso 

along with increased shortness of breath thus needs greater assist with  self 

care, bed  mobility, transfers, sit<->stand.  Pt with decreased pain to 7/10 35 

mins after taking meds.  Pt will continue to benefit from skilled OT to address 

above impairment areas to maxmize function in self care, transfers & mobility.  

Pt may benefit from respiratory therapy and incentive spirometer to improve 

pulmonary function so can complete tasks with greater ease.  Pt will likely need

24 hour care at home 2' multiple comorbidities & high fall risk/hx of falls OR 

[facility] pending progress in acute rehab.





- Goals


Timeframe: 1 week


Comment: *GROOMING: I/setup seated.  *UPPER BODY DRESSING: S/setup seated in 

w/c.  *LOWER BODY DRESSING: Pt to don pants, B socks with moderate assist  & 

verbal cues.  *TOILETING: moderate assist and verbal cues steadying with RW, 

grab bar prn.  *TRANSFERS:<->bed, commode, w/c, & shower bench Min assist and 

verbal cues.  *W/C PROPULSION: 100 feet with Supervision, manage B bbarakes with

ClosE S for safety.  *BATHING: moderate assist & verbal cues seated





- Provider


Occupational Therapist:: Dyana Khan


License Number: [id number]





Speech Therapy





- Consult Information


Patient on Program: Yes


Medical Diagnosis: ICH; Parkinsons disease


Treatment Diagnosis: -moderate to severe dysphonia.  -mild dysarthria.  -mild 

cognitive linguistic deficits





- Assessment


Memory Impairment: Mild


Speech/Articulation Impairment: Moderate


Comment: moderate-severe dysphonia; mild dysarthria





- Plan


Assessment: Jonatan Cline presents with 1.) moderate-severe dysphonia 

characterized by breathy, hoarse vocal quality with periods of aphonia and pitch

breaks; 2.) mild dysarthria characterized by impaired articulatory precision at 

the sentence level; and 3.) mild cognitive-linguistic deficits characterized by 

impaired short-term recall and word retrieval. Pt and family reported that vocal

hoarseness is worsened since admission to hospital and pt states "It bothers me 

that people don't understand me". He participates well in therapy and would 

benefit from continued ST for improved vocal quality, intelligibility, and cogn

itive-linguistic skills.


Plan: Continue Speech/Language Therapy


Frequency: 3-5 times per week


Duration: 1 week


Goals/Timeframe: Please see progress note dated 10/29/18 for updated goals/POC


Recommendations: Continue ST 3-5x/week





- Provider


Therapist: Shanell Tate


License Number: [id number]





Recreational Therapy





- Participation


Participation: Participates in Individual and/or Group Sessions





- Socialization


Level of Socialization: Initiates/interacts freely with care givers and peer





- Diversional Time


Diversional Time: enjoys playing [employer], watching television





- Assessment


Assessment/Plan: Pt is agreeable to participate in recreation therapy sessions 

following encouragement. Pt enjoys participating in domDisrupt CK task as he can 

recall task rules independently. For any new leisure tasks that pt is oriented 

to, pt requires min-mod verbal cues for direction following and carryover of 

task rules. Pt's barriers to participation is pain and/or fatigue. However, 

provide pt with rest breaks and deep breathing in between rounds of leisure 

tasks. Pt will continue to benefit from participating in recreation therapy 

sessions throughout stay on unit.


Problems Currently Limiting Participation: Pt will be encouraged to participate 

in 1:1 and group recreation therapy sessions 3-5x week to improve direction 

following, command following, leisure awareness level, attention to task, and 

divert pt from pain.


Goals and Time Frame: Pt will be encouraged to participate in 1:1 and group 

recreation therapy sessions 3-5x week to improve direction following, command 

following, leisure awareness level, attention to task, and divert pt from pain.





Nutrition





- Current Diet


Current Diet/Supplement/Feedings: Moderate consistent CHO heart healthy diet





- Appetite


Percent Meal Consumed: 50-74%





- Assessment/Goals/Time Frame


Assessments/Goals/Time Frame: Pt at moderate nutritional risk.  goals: 1.  Pt to

consume % of meals.  2.  Blood glucoses to be between  mg/dl.  

Follow-up due on 11/01/2018





- Provider


Provider: Lesa Yi





Case Management





- Discharge Plan


Discharge Plan: Home with significant other/family





Rehabilitation Plan





- Treatment Plan


Treatment Plan: Physical Therapy, Occupational Therapy, Speech, Dietary, 

Patient/Family Education





- Discharge Plan


Discharge to: Home

## 2018-10-30 NOTE — CP.PCM.PN
Subjective





- Date & Time of Evaluation


Date of Evaluation: 10/30/18


Time of Evaluation: 11:00





- Subjective


Subjective: 





patient seen and examined at bedside. no acute events overnight. patient states 

with cough that started one day ago, continues with occasional pain of left rib.

no other complaints offered at this time. no f/c/n/v/d/c.





Objective





- Vital Signs/Intake and Output


Vital Signs (last 24 hours): 


                                        











Temp Pulse Resp BP Pulse Ox


 


 97.9 F   75   19   124/83   96 


 


 10/30/18 08:58  10/30/18 08:58  10/30/18 08:58  10/30/18 08:58  10/30/18 08:32











- Medications


Medications: 


                               Current Medications





Albuterol/Ipratropium (Duoneb 3 Mg/0.5 Mg (3 Ml) Ud)  3 ml INH RTID [facility]


   Last Admin: 10/30/18 07:51 Dose:  Not Given


Carbidopa/Levodopa (Sinemet)  1 tab PO QID [facility]


   Last Admin: 10/30/18 08:39 Dose:  1 tab


Gabapentin (Neurontin)  100 mg PO Q12 [facility]


   Last Admin: 10/30/18 08:39 Dose:  100 mg


Guaifenesin (Mucinex La)  600 mg PO Q12 [facility]


Home Med (Patient's Own Medication)  1 unit PO DAILY [facility]


   Last Admin: 10/30/18 08:40 Dose:  1 unit


Insulin Detemir (Levemir)  35 units SC HS [facility]


   Last Admin: 10/29/18 21:30 Dose:  35 units


Insulin Human Regular (Humulin R)  0 units SC ACCU-CHECK [facility]; Protocol


   Last Admin: 10/30/18 07:27 Dose:  Not Given


Lactic Acid (Lac-Hydrin 12% Lotion (225 G))  1 applic TOP Q8 [facility]


   Last Admin: 10/30/18 06:15 Dose:  1 applic


Lactulose (Enulose)  10 gm PO DAILY PRN


   PRN Reason: Constipation


   Last Admin: 10/27/18 08:35 Dose:  10 gm


Lidocaine (Lidoderm)  1 ea TD 0900 [facility]


   Last Admin: 10/30/18 08:40 Dose:  1 ea


Lisinopril (Zestril)  2.5 mg PO DAILY [facility]


   Last Admin: 10/30/18 08:39 Dose:  2.5 mg


Multivitamins/Minerals (Therapeutic-M Tab)  1 tab PO DAILY [facility]


   Last Admin: 10/30/18 08:40 Dose:  1 tab


Oxycodone/Acetaminophen (Percocet 5/325 Mg Tab)  1 tab PO Q6 PRN


   PRN Reason: Pain, (4-10)


   Stop: 11/01/18 08:48


   Last Admin: 10/29/18 11:31 Dose:  1 tab


Sitagliptin Phosphate (Januvia)  100 mg PO DAILY [facility]


   Last Admin: 10/30/18 08:40 Dose:  100 mg











- Labs


Labs: 


                                        





                                 10/25/18 06:10 





                                 10/29/18 09:40 











- Constitutional


Appears: Well, Non-toxic, No Acute Distress





- Head Exam


Head Exam: NORMAL INSPECTION





- Eye Exam


Eye Exam: Normal appearance





- Neck Exam


Neck Exam: Normal Inspection





- Respiratory Exam


Respiratory Exam: Rhonchi, NORMAL BREATHING PATTERN





- Cardiovascular Exam


Cardiovascular Exam: +S1, +S2





- GI/Abdominal Exam


GI & Abdominal Exam: Soft, Normal Bowel Sounds





- Extremities Exam


Extremities Exam: Normal Inspection





- Neurological Exam


Neurological Exam: Alert, Awake





- Psychiatric Exam


Psychiatric exam: Normal Affect, Normal Mood





- Skin


Skin Exam: Normal Color, Warm





Assessment and Plan





- Assessment and Plan (Free Text)


Assessment: 





77 y/o male with hx of DM 2, Parkinsons  who had a fall at home and sustained 

multiple left side rib fractures


and noted to have cerebral hemorrhage that did not show progression. currently 

in acute rehab for PT.





continue PT/OT


continue meds as prescribed


obtain CXR


start duonebs and mucinex prn

## 2018-10-30 NOTE — CP.PCM.PN
Subjective





- Date & Time of Evaluation


Date of Evaluation: 10/30/18


Time of Evaluation: 16:37





- Subjective


Subjective: 


Jonatan was born 1940 and was admitted secondary to ICH.   CAD status post 5

vessel bypass, parkinsons disease, admitted for acute rehab


Pain is present in the ribs


denies fever or HA





continue current care





Objective





- Vital Signs/Intake and Output


Vital Signs (last 24 hours): 


                                        











Temp Pulse Resp BP Pulse Ox


 


 97.9 F   75   19   124/83   96 


 


 10/30/18 08:58  10/30/18 08:58  10/30/18 08:58  10/30/18 08:58  10/30/18 08:32











- Medications


Medications: 


                               Current Medications





Albuterol/Ipratropium (Duoneb 3 Mg/0.5 Mg (3 Ml) Ud)  3 ml INH RTID [facility]


   Last Admin: 10/30/18 13:15 Dose:  3 ml


Carbidopa/Levodopa (Sinemet)  1 tab PO QID [facility]


   Last Admin: 10/30/18 13:14 Dose:  1 tab


Gabapentin (Neurontin)  100 mg PO Q12 [facility]


   Last Admin: 10/30/18 08:39 Dose:  100 mg


Guaifenesin (Mucinex La)  600 mg PO Q12 [facility]


   Last Admin: 10/30/18 13:14 Dose:  600 mg


Home Med (Patient's Own Medication)  1 unit PO DAILY [facility]


   Last Admin: 10/30/18 08:40 Dose:  1 unit


Insulin Detemir (Levemir)  35 units SC HS [facility]


   Last Admin: 10/29/18 21:30 Dose:  35 units


Insulin Human Regular (Humulin R)  0 units SC ACCU-CHECK [facility]; Protocol


   Last Admin: 10/30/18 11:45 Dose:  Not Given


Lactic Acid (Lac-Hydrin 12% Lotion (225 G))  1 applic TOP Q8 [facility]


   Last Admin: 10/30/18 13:16 Dose:  1 applic


Lactulose (Enulose)  10 gm PO DAILY PRN


   PRN Reason: Constipation


   Last Admin: 10/27/18 08:35 Dose:  10 gm


Lidocaine (Lidoderm)  1 ea TD 0900 [facility]


   Last Admin: 10/30/18 08:40 Dose:  1 ea


Lisinopril (Zestril)  2.5 mg PO DAILY [facility]


   Last Admin: 10/30/18 08:39 Dose:  2.5 mg


Multivitamins/Minerals (Therapeutic-M Tab)  1 tab PO DAILY [facility]


   Last Admin: 10/30/18 08:40 Dose:  1 tab


Oxycodone/Acetaminophen (Percocet 5/325 Mg Tab)  1 tab PO Q6 PRN


   PRN Reason: Pain, (4-10)


   Stop: 11/01/18 08:48


   Last Admin: 10/29/18 11:31 Dose:  1 tab


Sitagliptin Phosphate (Januvia)  100 mg PO DAILY [facility]


   Last Admin: 10/30/18 08:40 Dose:  100 mg











- Labs


Labs: 


                                        





                                 10/25/18 06:10 





                                 10/29/18 09:40

## 2018-10-31 RX ADMIN — CARBIDOPA AND LEVODOPA SCH TAB: 25; 250 TABLET ORAL at 09:06

## 2018-10-31 RX ADMIN — GUAIFENESIN SCH MG: 600 TABLET, EXTENDED RELEASE ORAL at 09:07

## 2018-10-31 RX ADMIN — INSULIN DETEMIR SCH UNITS: 100 INJECTION, SOLUTION SUBCUTANEOUS at 22:25

## 2018-10-31 RX ADMIN — IPRATROPIUM BROMIDE AND ALBUTEROL SULFATE SCH ML: .5; 3 SOLUTION RESPIRATORY (INHALATION) at 19:13

## 2018-10-31 RX ADMIN — Medication SCH TAB: at 09:07

## 2018-10-31 RX ADMIN — CARBIDOPA AND LEVODOPA SCH TAB: 25; 250 TABLET ORAL at 17:56

## 2018-10-31 RX ADMIN — GUAIFENESIN SCH MG: 600 TABLET, EXTENDED RELEASE ORAL at 21:33

## 2018-10-31 RX ADMIN — CARBIDOPA AND LEVODOPA SCH TAB: 25; 250 TABLET ORAL at 21:33

## 2018-10-31 RX ADMIN — IPRATROPIUM BROMIDE AND ALBUTEROL SULFATE SCH: .5; 3 SOLUTION RESPIRATORY (INHALATION) at 13:58

## 2018-10-31 RX ADMIN — CARBIDOPA AND LEVODOPA SCH TAB: 25; 250 TABLET ORAL at 13:16

## 2018-10-31 RX ADMIN — IPRATROPIUM BROMIDE AND ALBUTEROL SULFATE SCH ML: .5; 3 SOLUTION RESPIRATORY (INHALATION) at 07:28

## 2018-11-01 RX ADMIN — CARBIDOPA AND LEVODOPA SCH TAB: 25; 250 TABLET ORAL at 08:28

## 2018-11-01 RX ADMIN — OXYCODONE HYDROCHLORIDE AND ACETAMINOPHEN PRN TAB: 5; 325 TABLET ORAL at 08:28

## 2018-11-01 RX ADMIN — IPRATROPIUM BROMIDE AND ALBUTEROL SULFATE SCH ML: .5; 3 SOLUTION RESPIRATORY (INHALATION) at 13:47

## 2018-11-01 RX ADMIN — CARBIDOPA AND LEVODOPA SCH TAB: 25; 250 TABLET ORAL at 21:25

## 2018-11-01 RX ADMIN — CARBIDOPA AND LEVODOPA SCH TAB: 25; 250 TABLET ORAL at 12:26

## 2018-11-01 RX ADMIN — GUAIFENESIN SCH MG: 600 TABLET, EXTENDED RELEASE ORAL at 08:28

## 2018-11-01 RX ADMIN — Medication SCH TAB: at 08:29

## 2018-11-01 RX ADMIN — IPRATROPIUM BROMIDE AND ALBUTEROL SULFATE SCH ML: .5; 3 SOLUTION RESPIRATORY (INHALATION) at 07:35

## 2018-11-01 RX ADMIN — GUAIFENESIN SCH MG: 600 TABLET, EXTENDED RELEASE ORAL at 21:25

## 2018-11-01 RX ADMIN — CARBIDOPA AND LEVODOPA SCH TAB: 25; 250 TABLET ORAL at 17:14

## 2018-11-01 RX ADMIN — IPRATROPIUM BROMIDE AND ALBUTEROL SULFATE SCH ML: .5; 3 SOLUTION RESPIRATORY (INHALATION) at 19:21

## 2018-11-01 NOTE — CP.PCM.PN
Subjective





- Date & Time of Evaluation


Date of Evaluation: 11/01/18


Time of Evaluation: 10:30





- Subjective


Subjective: 





79 y/o m seen and examined with Dr Suresh by bedside. Pt reports feeling 

well, with NO acute complaints. Pt afebrile, tolerating PO. Pt had a sugar level

of 43 overnight.








Objective





- Vital Signs/Intake and Output


Vital Signs (last 24 hours): 


                                        











Temp Pulse Resp BP Pulse Ox


 


 97.8 F   79   22   150/74   97 


 


 11/01/18 08:08  11/01/18 08:29  11/01/18 08:08  11/01/18 08:29  11/01/18 08:08











- Medications


Medications: 


                               Current Medications





Albuterol/Ipratropium (Duoneb 3 Mg/0.5 Mg (3 Ml) Ud)  3 ml INH RTID [facility]


   Last Admin: 11/01/18 13:47 Dose:  3 ml


Carbidopa/Levodopa (Sinemet)  1 tab PO QID [facility]


   Last Admin: 11/01/18 17:14 Dose:  1 tab


Gabapentin (Neurontin)  100 mg PO Q12 [facility]


   Last Admin: 11/01/18 08:28 Dose:  100 mg


Guaifenesin (Mucinex La)  600 mg PO Q12 [facility]


   Last Admin: 11/01/18 08:28 Dose:  600 mg


Home Med (Patient's Own Medication)  1 unit PO DAILY [facility]


   Last Admin: 11/01/18 08:29 Dose:  1 unit


Insulin Detemir (Levemir)  30 units SC HS [facility]


Insulin Human Regular (Humulin R)  0 units SC ACCU-CHECK [facility]; Protocol


   Last Admin: 11/01/18 16:48 Dose:  Not Given


Lactic Acid (Lac-Hydrin 12% Lotion (225 G))  1 applic TOP Q8 [facility]


   Last Admin: 11/01/18 13:29 Dose:  1 applic


Lactulose (Enulose)  10 gm PO DAILY PRN


   PRN Reason: Constipation


   Last Admin: 10/27/18 08:35 Dose:  10 gm


Lidocaine (Lidoderm)  1 ea TD 0900 [facility]


   Last Admin: 11/01/18 08:29 Dose:  1 ea


Lisinopril (Zestril)  2.5 mg PO DAILY [facility]


   Last Admin: 11/01/18 08:29 Dose:  2.5 mg


Multivitamins/Minerals (Therapeutic-M Tab)  1 tab PO DAILY [facility]


   Last Admin: 11/01/18 08:29 Dose:  1 tab


Oxycodone/Acetaminophen (Percocet 5/325 Mg Tab)  1 tab PO Q6 PRN


   PRN Reason: Pain, (4-10)


   Stop: 11/04/18 08:48


   Last Admin: 11/01/18 08:28 Dose:  1 tab


Sitagliptin Phosphate (Januvia)  100 mg PO DAILY [facility]


   Last Admin: 11/01/18 08:28 Dose:  100 mg











- Labs


Labs: 


                                        





                                 10/25/18 06:10 





                                 10/29/18 09:40 











- Constitutional


Appears: No Acute Distress





- Head Exam


Head Exam: NORMAL INSPECTION





- Eye Exam


Eye Exam: EOMI





- ENT Exam


ENT Exam: Mucous Membranes Dry





- Neck Exam


Neck Exam: Full ROM





- Respiratory Exam


Respiratory Exam: Clear to Ausculation Bilateral, NORMAL BREATHING PATTERN





- Cardiovascular Exam


Cardiovascular Exam: REGULAR RHYTHM, +S1, +S2





- GI/Abdominal Exam


GI & Abdominal Exam: Soft.  absent: Guarding, Rigid, Tenderness





- Neurological Exam


Neurological Exam: Alert, Awake, Oriented x3





Assessment and Plan


(1) Diabetes mellitus type 2 in nonobese


Status: Chronic   





(2) Parkinson disease


Status: Chronic   





(3) CAD (coronary artery disease)


Status: Chronic   





(4) Cerebral hemorrhage


Status: Acute   





(5) HTN (hypertension)


Status: Chronic   





- Assessment and Plan (Free Text)


Plan: 





--Will decrease Levemir to 30 units HS. 


--Will f/u sugar levels


--Continue physical therapy.


--Continue plan as ordered.

## 2018-11-02 RX ADMIN — IPRATROPIUM BROMIDE AND ALBUTEROL SULFATE SCH ML: .5; 3 SOLUTION RESPIRATORY (INHALATION) at 08:11

## 2018-11-02 RX ADMIN — CARBIDOPA AND LEVODOPA SCH TAB: 25; 250 TABLET ORAL at 17:12

## 2018-11-02 RX ADMIN — GUAIFENESIN SCH MG: 600 TABLET, EXTENDED RELEASE ORAL at 21:14

## 2018-11-02 RX ADMIN — OXYCODONE HYDROCHLORIDE AND ACETAMINOPHEN PRN TAB: 5; 325 TABLET ORAL at 12:18

## 2018-11-02 RX ADMIN — INSULIN DETEMIR SCH UNITS: 100 INJECTION, SOLUTION SUBCUTANEOUS at 21:14

## 2018-11-02 RX ADMIN — CARBIDOPA AND LEVODOPA SCH TAB: 25; 250 TABLET ORAL at 21:14

## 2018-11-02 RX ADMIN — GUAIFENESIN SCH MG: 600 TABLET, EXTENDED RELEASE ORAL at 08:16

## 2018-11-02 RX ADMIN — Medication SCH TAB: at 08:15

## 2018-11-02 RX ADMIN — IPRATROPIUM BROMIDE AND ALBUTEROL SULFATE SCH: .5; 3 SOLUTION RESPIRATORY (INHALATION) at 19:53

## 2018-11-02 RX ADMIN — IPRATROPIUM BROMIDE AND ALBUTEROL SULFATE SCH: .5; 3 SOLUTION RESPIRATORY (INHALATION) at 14:24

## 2018-11-02 RX ADMIN — CARBIDOPA AND LEVODOPA SCH TAB: 25; 250 TABLET ORAL at 08:15

## 2018-11-02 RX ADMIN — CARBIDOPA AND LEVODOPA SCH TAB: 25; 250 TABLET ORAL at 12:20

## 2018-11-02 NOTE — CP.PCM.PN
Subjective





- Date & Time of Evaluation


Date of Evaluation: 11/02/18


Time of Evaluation: 19:53





- Subjective


Subjective: 





Patient seen in the room


in good spirits


denies sob/cp


has still left ribcage pain but tolerable


Lidoderm patch on at this time


continue current care





Objective





- Vital Signs/Intake and Output


Vital Signs (last 24 hours): 


                                        











Temp Pulse Resp BP Pulse Ox


 


 96.8 F L  61   20   143/76   98 


 


 11/02/18 07:35  11/02/18 08:56  11/02/18 07:35  11/02/18 08:16  11/02/18 08:56











- Medications


Medications: 


                               Current Medications





Albuterol/Ipratropium (Duoneb 3 Mg/0.5 Mg (3 Ml) Ud)  3 ml INH RTID [facility]


   Last Admin: 11/02/18 14:24 Dose:  Not Given


Carbidopa/Levodopa (Sinemet)  1 tab PO QID [facility]


   Last Admin: 11/02/18 17:12 Dose:  1 tab


Dextrose (Dextrose 50% Inj)  0 ml IV STAT PRN; Protocol


   PRN Reason: Hypoglycemia Protocol


Dextrose (Glutose 15)  0 gm PO ONCE PRN; Protocol


   PRN Reason: Hypoglycemia Protocol


Gabapentin (Neurontin)  100 mg PO Q12 [facility]


   Last Admin: 11/02/18 08:16 Dose:  100 mg


Glucagon (Glucagen Diagnostic Kit)  0 mg IM STAT PRN; Protocol


   PRN Reason: Hypoglycemia Protocol


Guaifenesin (Mucinex La)  600 mg PO Q12 [facility]


   Last Admin: 11/02/18 08:16 Dose:  600 mg


Home Med (Patient's Own Medication)  1 unit PO DAILY [facility]


   Last Admin: 11/02/18 08:15 Dose:  1 unit


Ibuprofen (Motrin Tab)  600 mg PO Q6 [facility]


   Last Admin: 11/02/18 17:11 Dose:  600 mg


Insulin Detemir (Levemir)  20 units SC HS [facility]


Insulin Human Regular (Humulin R)  0 units SC ACCU-CHECK [facility]; Protocol


   Last Admin: 11/02/18 16:50 Dose:  Not Given


Lactic Acid (Lac-Hydrin 12% Lotion (225 G))  1 applic TOP Q8 [facility]


   Last Admin: 11/02/18 13:11 Dose:  1 applic


Lactulose (Enulose)  10 gm PO DAILY PRN


   PRN Reason: Constipation


   Last Admin: 10/27/18 08:35 Dose:  10 gm


Lidocaine (Lidoderm)  1 ea TD 0900 [facility]


   Last Admin: 11/02/18 08:16 Dose:  1 ea


Lisinopril (Zestril)  2.5 mg PO DAILY [facility]


   Last Admin: 11/02/18 08:16 Dose:  2.5 mg


Multivitamins/Minerals (Therapeutic-M Tab)  1 tab PO DAILY [facility]


   Last Admin: 11/02/18 08:15 Dose:  1 tab


Oxycodone/Acetaminophen (Percocet 5/325 Mg Tab)  1 tab PO Q6 PRN


   PRN Reason: Pain, (4-10)


   Stop: 11/04/18 08:48


   Last Admin: 11/02/18 12:18 Dose:  1 tab


Pantoprazole Sodium (Protonix Ec Tab)  20 mg PO DAILY [facility]


Sitagliptin Phosphate (Januvia)  100 mg PO DAILY [facility]


   Last Admin: 11/02/18 08:15 Dose:  100 mg











- Labs


Labs: 


                                        





                                 10/25/18 06:10 





                                 10/29/18 09:40

## 2018-11-03 RX ADMIN — IPRATROPIUM BROMIDE AND ALBUTEROL SULFATE SCH ML: .5; 3 SOLUTION RESPIRATORY (INHALATION) at 19:09

## 2018-11-03 RX ADMIN — CARBIDOPA AND LEVODOPA SCH TAB: 25; 250 TABLET ORAL at 12:21

## 2018-11-03 RX ADMIN — GUAIFENESIN SCH MG: 600 TABLET, EXTENDED RELEASE ORAL at 08:53

## 2018-11-03 RX ADMIN — CARBIDOPA AND LEVODOPA SCH TAB: 25; 250 TABLET ORAL at 08:52

## 2018-11-03 RX ADMIN — PANTOPRAZOLE SODIUM SCH MG: 20 TABLET, DELAYED RELEASE ORAL at 08:53

## 2018-11-03 RX ADMIN — INSULIN DETEMIR SCH UNITS: 100 INJECTION, SOLUTION SUBCUTANEOUS at 21:28

## 2018-11-03 RX ADMIN — Medication SCH TAB: at 08:50

## 2018-11-03 RX ADMIN — CARBIDOPA AND LEVODOPA SCH TAB: 25; 250 TABLET ORAL at 17:26

## 2018-11-03 RX ADMIN — IPRATROPIUM BROMIDE AND ALBUTEROL SULFATE SCH: .5; 3 SOLUTION RESPIRATORY (INHALATION) at 07:48

## 2018-11-03 RX ADMIN — IPRATROPIUM BROMIDE AND ALBUTEROL SULFATE SCH: .5; 3 SOLUTION RESPIRATORY (INHALATION) at 13:50

## 2018-11-03 RX ADMIN — GUAIFENESIN SCH MG: 600 TABLET, EXTENDED RELEASE ORAL at 21:16

## 2018-11-03 RX ADMIN — CARBIDOPA AND LEVODOPA SCH TAB: 25; 250 TABLET ORAL at 21:16

## 2018-11-03 NOTE — CP.PCM.PN
Subjective





- Date & Time of Evaluation


Date of Evaluation: 11/03/18


Time of Evaluation: 14:00





- Subjective


Subjective: 





patient seen and examined at bedside. no acute events overnight. no other 

complaints reported.





Objective





- Vital Signs/Intake and Output


Vital Signs (last 24 hours): 


                                        











Temp Pulse Resp BP Pulse Ox


 


 97.6 F   88   22   129/79   96 


 


 11/03/18 08:39  11/03/18 08:50  11/03/18 08:39  11/03/18 08:50  11/03/18 08:39











- Medications


Medications: 


                               Current Medications





Albuterol/Ipratropium (Duoneb 3 Mg/0.5 Mg (3 Ml) Ud)  3 ml INH RTID [facility]


   Last Admin: 11/03/18 13:50 Dose:  Not Given


Carbidopa/Levodopa (Sinemet)  1 tab PO QID [facility]


   Last Admin: 11/03/18 17:26 Dose:  1 tab


Dextrose (Dextrose 50% Inj)  0 ml IV STAT PRN; Protocol


   PRN Reason: Hypoglycemia Protocol


Dextrose (Glutose 15)  0 gm PO ONCE PRN; Protocol


   PRN Reason: Hypoglycemia Protocol


Gabapentin (Neurontin)  100 mg PO Q12 [facility]


   Last Admin: 11/03/18 08:53 Dose:  100 mg


Glucagon (Glucagen Diagnostic Kit)  0 mg IM STAT PRN; Protocol


   PRN Reason: Hypoglycemia Protocol


Guaifenesin (Mucinex La)  600 mg PO Q12 [facility]


   Last Admin: 11/03/18 08:53 Dose:  600 mg


Home Med (Patient's Own Medication)  1 unit PO DAILY [facility]


   Last Admin: 11/03/18 08:54 Dose:  1 unit


Ibuprofen (Motrin Tab)  600 mg PO Q8 PRN


   PRN Reason: Pain, moderate (4-7)


Insulin Detemir (Levemir)  20 units SC HS [facility]


   Last Admin: 11/02/18 21:14 Dose:  20 units


Insulin Human Regular (Humulin R)  0 units SC ACCU-CHECK [facility]; Protocol


   Last Admin: 11/03/18 17:24 Dose:  Not Given


Lactic Acid (Lac-Hydrin 12% Lotion (225 G))  1 applic TOP Q8 [facility]


   Last Admin: 11/03/18 14:26 Dose:  1 applic


Lactulose (Enulose)  10 gm PO DAILY PRN


   PRN Reason: Constipation


   Last Admin: 10/27/18 08:35 Dose:  10 gm


Lidocaine (Lidoderm)  1 ea TD 0900 [facility]


   Last Admin: 11/03/18 08:53 Dose:  1 ea


Lisinopril (Zestril)  2.5 mg PO DAILY [facility]


   Last Admin: 11/03/18 08:50 Dose:  2.5 mg


Multivitamins/Minerals (Therapeutic-M Tab)  1 tab PO DAILY [facility]


   Last Admin: 11/03/18 08:50 Dose:  1 tab


Oxycodone/Acetaminophen (Percocet 5/325 Mg Tab)  1 tab PO Q6 PRN


   PRN Reason: Pain, severe (8-10)


   Stop: 11/06/18 16:13


Pantoprazole Sodium (Protonix Ec Tab)  20 mg PO DAILY [facility]


   Last Admin: 11/03/18 08:53 Dose:  20 mg


Sitagliptin Phosphate (Januvia)  100 mg PO DAILY [facility]


   Last Admin: 11/03/18 08:53 Dose:  100 mg











- Labs


Labs: 


                                        





                                 10/25/18 06:10 





                                 10/29/18 09:40 











- Additional Findings


Additional findings: 





- Constitutional


Appears: No Acute Distress





- Head Exam


Head Exam: NORMAL INSPECTION





- Respiratory Exam


Respiratory Exam: Clear to Ausculation Bilateral, NORMAL BREATHING PATTERN





- Cardiovascular Exam


Cardiovascular Exam: REGULAR RHYTHM, +S1, +S2





- GI/Abdominal Exam


GI & Abdominal Exam: Soft.  absent: Guarding, Rigid, Tenderness





- Neurological Exam


Neurological Exam: Alert, Awake, Oriented x3





Assessment and Plan





- Assessment and Plan (Free Text)


Assessment: 





79 y/o male with hx of DM 2, Parkinsons  who had a fall at home and sustained 

multiple left side rib fractures and noted to have cerebral hemorrhage that did 

not show progression. currently in acute rehab for PT.





f/u sugar levels


decrease nsaids to prn


Continue physical therapy.


Continue plan as ordered.

## 2018-11-03 NOTE — CP.PCM.PN
Subjective





- Date & Time of Evaluation


Date of Evaluation: 10/31/18


Time of Evaluation: 11:00





- Subjective


Subjective: 





patient seen and examined at bedside. no acute events overnight. no other 

complaints reported.





Objective





- Vital Signs/Intake and Output


Vital Signs (last 24 hours): 


                                        











Temp Pulse Resp BP Pulse Ox


 


 97.6 F   88   22   129/79   96 


 


 11/03/18 08:39  11/03/18 08:50  11/03/18 08:39  11/03/18 08:50  11/03/18 08:39











- Medications


Medications: 


                               Current Medications





Albuterol/Ipratropium (Duoneb 3 Mg/0.5 Mg (3 Ml) Ud)  3 ml INH RTID [facility]


   Last Admin: 11/03/18 13:50 Dose:  Not Given


Carbidopa/Levodopa (Sinemet)  1 tab PO QID [facility]


   Last Admin: 11/03/18 17:26 Dose:  1 tab


Dextrose (Dextrose 50% Inj)  0 ml IV STAT PRN; Protocol


   PRN Reason: Hypoglycemia Protocol


Dextrose (Glutose 15)  0 gm PO ONCE PRN; Protocol


   PRN Reason: Hypoglycemia Protocol


Gabapentin (Neurontin)  100 mg PO Q12 [facility]


   Last Admin: 11/03/18 08:53 Dose:  100 mg


Glucagon (Glucagen Diagnostic Kit)  0 mg IM STAT PRN; Protocol


   PRN Reason: Hypoglycemia Protocol


Guaifenesin (Mucinex La)  600 mg PO Q12 [facility]


   Last Admin: 11/03/18 08:53 Dose:  600 mg


Home Med (Patient's Own Medication)  1 unit PO DAILY [facility]


   Last Admin: 11/03/18 08:54 Dose:  1 unit


Ibuprofen (Motrin Tab)  600 mg PO Q8 PRN


   PRN Reason: Pain, moderate (4-7)


Insulin Detemir (Levemir)  20 units SC HS [facility]


   Last Admin: 11/02/18 21:14 Dose:  20 units


Insulin Human Regular (Humulin R)  0 units SC ACCU-CHECK [facility]; Protocol


   Last Admin: 11/03/18 17:24 Dose:  Not Given


Lactic Acid (Lac-Hydrin 12% Lotion (225 G))  1 applic TOP Q8 [facility]


   Last Admin: 11/03/18 14:26 Dose:  1 applic


Lactulose (Enulose)  10 gm PO DAILY PRN


   PRN Reason: Constipation


   Last Admin: 10/27/18 08:35 Dose:  10 gm


Lidocaine (Lidoderm)  1 ea TD 0900 [facility]


   Last Admin: 11/03/18 08:53 Dose:  1 ea


Lisinopril (Zestril)  2.5 mg PO DAILY [facility]


   Last Admin: 11/03/18 08:50 Dose:  2.5 mg


Multivitamins/Minerals (Therapeutic-M Tab)  1 tab PO DAILY [facility]


   Last Admin: 11/03/18 08:50 Dose:  1 tab


Oxycodone/Acetaminophen (Percocet 5/325 Mg Tab)  1 tab PO Q6 PRN


   PRN Reason: Pain, severe (8-10)


   Stop: 11/06/18 16:13


Pantoprazole Sodium (Protonix Ec Tab)  20 mg PO DAILY [facility]


   Last Admin: 11/03/18 08:53 Dose:  20 mg


Sitagliptin Phosphate (Januvia)  100 mg PO DAILY [facility]


   Last Admin: 11/03/18 08:53 Dose:  100 mg











- Labs


Labs: 


                                        





                                 10/25/18 06:10 





                                 10/29/18 09:40 











- Additional Findings


Additional findings: 





- Constitutional


Appears: No Acute Distress





- Head Exam


Head Exam: NORMAL INSPECTION





- Respiratory Exam


Respiratory Exam: Clear to Ausculation Bilateral, NORMAL BREATHING PATTERN





- Cardiovascular Exam


Cardiovascular Exam: REGULAR RHYTHM, +S1, +S2





- GI/Abdominal Exam


GI & Abdominal Exam: Soft.  absent: Guarding, Rigid, Tenderness





- Neurological Exam


Neurological Exam: Alert, Awake, Oriented x3





Assessment and Plan





- Assessment and Plan (Free Text)


Assessment: 





77 y/o male with hx of DM 2, Parkinsons  who had a fall at home and sustained 

multiple left side rib fractures and noted to have cerebral hemorrhage that did 

not show progression. currently in acute rehab for PT.





f/u sugar levels, adjust insulin accordingly


Continue physical therapy.


Continue plan as ordered.

## 2018-11-03 NOTE — CP.PCM.PN
Subjective





- Date & Time of Evaluation


Date of Evaluation: 11/02/18


Time of Evaluation: 10:00





- Subjective


Subjective: 





patient seen and examined at bedside. no acute events overnight. no other 

complaints reported.





Objective





- Vital Signs/Intake and Output


Vital Signs (last 24 hours): 


                                        











Temp Pulse Resp BP Pulse Ox


 


 97.6 F   88   22   129/79   96 


 


 11/03/18 08:39  11/03/18 08:50  11/03/18 08:39  11/03/18 08:50  11/03/18 08:39











- Medications


Medications: 


                               Current Medications





Albuterol/Ipratropium (Duoneb 3 Mg/0.5 Mg (3 Ml) Ud)  3 ml INH RTID [facility]


   Last Admin: 11/03/18 13:50 Dose:  Not Given


Carbidopa/Levodopa (Sinemet)  1 tab PO QID [facility]


   Last Admin: 11/03/18 17:26 Dose:  1 tab


Dextrose (Dextrose 50% Inj)  0 ml IV STAT PRN; Protocol


   PRN Reason: Hypoglycemia Protocol


Dextrose (Glutose 15)  0 gm PO ONCE PRN; Protocol


   PRN Reason: Hypoglycemia Protocol


Gabapentin (Neurontin)  100 mg PO Q12 [facility]


   Last Admin: 11/03/18 08:53 Dose:  100 mg


Glucagon (Glucagen Diagnostic Kit)  0 mg IM STAT PRN; Protocol


   PRN Reason: Hypoglycemia Protocol


Guaifenesin (Mucinex La)  600 mg PO Q12 [facility]


   Last Admin: 11/03/18 08:53 Dose:  600 mg


Home Med (Patient's Own Medication)  1 unit PO DAILY [facility]


   Last Admin: 11/03/18 08:54 Dose:  1 unit


Ibuprofen (Motrin Tab)  600 mg PO Q8 PRN


   PRN Reason: Pain, moderate (4-7)


Insulin Detemir (Levemir)  20 units SC HS [facility]


   Last Admin: 11/02/18 21:14 Dose:  20 units


Insulin Human Regular (Humulin R)  0 units SC ACCU-CHECK [facility]; Protocol


   Last Admin: 11/03/18 17:24 Dose:  Not Given


Lactic Acid (Lac-Hydrin 12% Lotion (225 G))  1 applic TOP Q8 [facility]


   Last Admin: 11/03/18 14:26 Dose:  1 applic


Lactulose (Enulose)  10 gm PO DAILY PRN


   PRN Reason: Constipation


   Last Admin: 10/27/18 08:35 Dose:  10 gm


Lidocaine (Lidoderm)  1 ea TD 0900 [facility]


   Last Admin: 11/03/18 08:53 Dose:  1 ea


Lisinopril (Zestril)  2.5 mg PO DAILY [facility]


   Last Admin: 11/03/18 08:50 Dose:  2.5 mg


Multivitamins/Minerals (Therapeutic-M Tab)  1 tab PO DAILY [facility]


   Last Admin: 11/03/18 08:50 Dose:  1 tab


Oxycodone/Acetaminophen (Percocet 5/325 Mg Tab)  1 tab PO Q6 PRN


   PRN Reason: Pain, severe (8-10)


   Stop: 11/06/18 16:13


Pantoprazole Sodium (Protonix Ec Tab)  20 mg PO DAILY [facility]


   Last Admin: 11/03/18 08:53 Dose:  20 mg


Sitagliptin Phosphate (Januvia)  100 mg PO DAILY [facility]


   Last Admin: 11/03/18 08:53 Dose:  100 mg











- Labs


Labs: 


                                        





                                 10/25/18 06:10 





                                 10/29/18 09:40 











- Additional Findings


Additional findings: 





- Constitutional


Appears: No Acute Distress





- Head Exam


Head Exam: NORMAL INSPECTION





- Respiratory Exam


Respiratory Exam: Clear to Ausculation Bilateral, NORMAL BREATHING PATTERN





- Cardiovascular Exam


Cardiovascular Exam: REGULAR RHYTHM, +S1, +S2





- GI/Abdominal Exam


GI & Abdominal Exam: Soft.  absent: Guarding, Rigid, Tenderness





- Neurological Exam


Neurological Exam: Alert, Awake, Oriented x3





Assessment and Plan





- Assessment and Plan (Free Text)


Assessment: 





79 y/o male with hx of DM 2, Parkinsons  who had a fall at home and sustained 

multiple left side rib fractures and noted to have cerebral hemorrhage that did 

not show progression. currently in acute rehab for PT.





f/u sugar levels


Continue physical therapy.


Continue plan as ordered.

## 2018-11-04 RX ADMIN — IPRATROPIUM BROMIDE AND ALBUTEROL SULFATE SCH ML: .5; 3 SOLUTION RESPIRATORY (INHALATION) at 19:08

## 2018-11-04 RX ADMIN — GUAIFENESIN SCH MG: 600 TABLET, EXTENDED RELEASE ORAL at 08:31

## 2018-11-04 RX ADMIN — CARBIDOPA AND LEVODOPA SCH TAB: 25; 250 TABLET ORAL at 08:32

## 2018-11-04 RX ADMIN — GUAIFENESIN SCH MG: 600 TABLET, EXTENDED RELEASE ORAL at 21:22

## 2018-11-04 RX ADMIN — CARBIDOPA AND LEVODOPA SCH TAB: 25; 250 TABLET ORAL at 21:22

## 2018-11-04 RX ADMIN — IPRATROPIUM BROMIDE AND ALBUTEROL SULFATE SCH ML: .5; 3 SOLUTION RESPIRATORY (INHALATION) at 13:02

## 2018-11-04 RX ADMIN — Medication SCH TAB: at 08:31

## 2018-11-04 RX ADMIN — CARBIDOPA AND LEVODOPA SCH TAB: 25; 250 TABLET ORAL at 13:16

## 2018-11-04 RX ADMIN — CARBIDOPA AND LEVODOPA SCH TAB: 25; 250 TABLET ORAL at 17:20

## 2018-11-04 RX ADMIN — INSULIN DETEMIR SCH UNITS: 100 INJECTION, SOLUTION SUBCUTANEOUS at 21:21

## 2018-11-04 RX ADMIN — IPRATROPIUM BROMIDE AND ALBUTEROL SULFATE SCH ML: .5; 3 SOLUTION RESPIRATORY (INHALATION) at 07:08

## 2018-11-04 RX ADMIN — PANTOPRAZOLE SODIUM SCH MG: 20 TABLET, DELAYED RELEASE ORAL at 09:08

## 2018-11-04 NOTE — CP.PCM.PN
Subjective





- Date & Time of Evaluation


Date of Evaluation: 11/04/18


Time of Evaluation: 11:00





- Subjective


Subjective: 





patient seen and examined at bedside. no acute events overnight. no other 

complaints reported. spoke with family at bedside.





Objective





- Vital Signs/Intake and Output


Vital Signs (last 24 hours): 


                                        











Temp Pulse Resp BP Pulse Ox


 


 97.3 F L  71   19   130/65   96 


 


 11/04/18 08:15  11/04/18 08:32  11/04/18 08:15  11/04/18 08:32  11/04/18 08:15











- Medications


Medications: 


                               Current Medications





Albuterol/Ipratropium (Duoneb 3 Mg/0.5 Mg (3 Ml) Ud)  3 ml INH RTID [facility]


   Last Admin: 11/04/18 13:02 Dose:  3 ml


Carbidopa/Levodopa (Sinemet)  1 tab PO QID [facility]


   Last Admin: 11/04/18 17:20 Dose:  1 tab


Dextrose (Dextrose 50% Inj)  0 ml IV STAT PRN; Protocol


   PRN Reason: Hypoglycemia Protocol


Dextrose (Glutose 15)  0 gm PO ONCE PRN; Protocol


   PRN Reason: Hypoglycemia Protocol


Gabapentin (Neurontin)  100 mg PO Q12 [facility]


   Last Admin: 11/04/18 08:32 Dose:  100 mg


Glucagon (Glucagen Diagnostic Kit)  0 mg IM STAT PRN; Protocol


   PRN Reason: Hypoglycemia Protocol


Guaifenesin (Mucinex La)  600 mg PO Q12 [facility]


   Last Admin: 11/04/18 08:31 Dose:  600 mg


Home Med (Patient's Own Medication)  1 unit PO DAILY [facility]


   Last Admin: 11/04/18 08:31 Dose:  1 unit


Ibuprofen (Motrin Tab)  600 mg PO Q8 PRN


   PRN Reason: Pain, moderate (4-7)


Insulin Detemir (Levemir)  20 units SC HS [facility]


   Last Admin: 11/03/18 21:28 Dose:  20 units


Insulin Human Regular (Humulin R)  0 units SC ACCU-CHECK [facility]; Protocol


   Last Admin: 11/04/18 17:19 Dose:  2 units


Lactic Acid (Lac-Hydrin 12% Lotion (225 G))  1 applic TOP Q8 [facility]


   Last Admin: 11/04/18 13:16 Dose:  1 applic


Lactulose (Enulose)  10 gm PO DAILY PRN


   PRN Reason: Constipation


   Last Admin: 10/27/18 08:35 Dose:  10 gm


Lidocaine (Lidoderm)  1 ea TD 0900 [facility]


   Last Admin: 11/04/18 08:33 Dose:  1 ea


Lisinopril (Zestril)  2.5 mg PO DAILY [facility]


   Last Admin: 11/04/18 08:32 Dose:  2.5 mg


Multivitamins/Minerals (Therapeutic-M Tab)  1 tab PO DAILY [facility]


   Last Admin: 11/04/18 08:31 Dose:  1 tab


Oxycodone/Acetaminophen (Percocet 5/325 Mg Tab)  1 tab PO Q6 PRN


   PRN Reason: Pain, severe (8-10)


   Stop: 11/06/18 16:13


Pantoprazole Sodium (Protonix Ec Tab)  20 mg PO DAILY [facility]


   Last Admin: 11/04/18 09:08 Dose:  20 mg


Sitagliptin Phosphate (Januvia)  100 mg PO DAILY [facility]


   Last Admin: 11/04/18 08:31 Dose:  100 mg











- Labs


Labs: 


                                        





                                 10/25/18 06:10 





                                 10/29/18 09:40 











- Additional Findings


Additional findings: 





- Constitutional


Appears: No Acute Distress





- Head Exam


Head Exam: NORMAL INSPECTION





- Respiratory Exam


Respiratory Exam: Clear to Ausculation Bilateral, NORMAL BREATHING PATTERN





- Cardiovascular Exam


Cardiovascular Exam: REGULAR RHYTHM, +S1, +S2





- GI/Abdominal Exam


GI & Abdominal Exam: Soft.  absent: Guarding, Rigid, Tenderness





- Neurological Exam


Neurological Exam: Alert, Awake, Oriented x3





Assessment and Plan





- Assessment and Plan (Free Text)


Assessment: 





77 y/o male with hx of DM 2, Parkinsons  who had a fall at home and sustained 

multiple left side rib fractures and noted to have cerebral hemorrhage that did 

not show progression. currently in acute rehab for PT.





f/u sugar levels


Continue physical therapy.


Continue plan as ordered.

## 2018-11-05 RX ADMIN — IPRATROPIUM BROMIDE AND ALBUTEROL SULFATE SCH ML: .5; 3 SOLUTION RESPIRATORY (INHALATION) at 07:43

## 2018-11-05 RX ADMIN — CARBIDOPA AND LEVODOPA SCH TAB: 25; 250 TABLET ORAL at 08:11

## 2018-11-05 RX ADMIN — LACTULOSE PRN GM: 10 SOLUTION ORAL at 21:04

## 2018-11-05 RX ADMIN — IPRATROPIUM BROMIDE AND ALBUTEROL SULFATE SCH: .5; 3 SOLUTION RESPIRATORY (INHALATION) at 14:56

## 2018-11-05 RX ADMIN — CARBIDOPA AND LEVODOPA SCH TAB: 25; 250 TABLET ORAL at 13:33

## 2018-11-05 RX ADMIN — GUAIFENESIN SCH MG: 600 TABLET, EXTENDED RELEASE ORAL at 08:10

## 2018-11-05 RX ADMIN — PANTOPRAZOLE SODIUM SCH MG: 20 TABLET, DELAYED RELEASE ORAL at 08:11

## 2018-11-05 RX ADMIN — Medication SCH TAB: at 08:11

## 2018-11-05 RX ADMIN — IPRATROPIUM BROMIDE AND ALBUTEROL SULFATE SCH ML: .5; 3 SOLUTION RESPIRATORY (INHALATION) at 19:16

## 2018-11-05 RX ADMIN — CARBIDOPA AND LEVODOPA SCH TAB: 25; 250 TABLET ORAL at 21:04

## 2018-11-05 RX ADMIN — INSULIN DETEMIR SCH UNITS: 100 INJECTION, SOLUTION SUBCUTANEOUS at 21:05

## 2018-11-05 RX ADMIN — CARBIDOPA AND LEVODOPA SCH TAB: 25; 250 TABLET ORAL at 17:11

## 2018-11-05 RX ADMIN — GUAIFENESIN SCH MG: 600 TABLET, EXTENDED RELEASE ORAL at 21:04

## 2018-11-05 NOTE — CP.PCM.PN
Subjective





- Date & Time of Evaluation


Date of Evaluation: 11/05/18


Time of Evaluation: 17:18





- Subjective


Subjective: 





Patient seen in the room, doing ok


denies sob/cp


working hard in therapies





Objective





- Vital Signs/Intake and Output


Vital Signs (last 24 hours): 


                                        











Temp Pulse Resp BP Pulse Ox


 


 98.1 F   86   20   139/67   97 


 


 11/05/18 07:28  11/05/18 10:28  11/05/18 07:28  11/05/18 08:10  11/05/18 07:28











- Medications


Medications: 


                               Current Medications





Albuterol/Ipratropium (Duoneb 3 Mg/0.5 Mg (3 Ml) Ud)  3 ml INH RTID [facility]


   Last Admin: 11/05/18 14:56 Dose:  Not Given


Carbidopa/Levodopa (Sinemet)  1 tab PO QID [facility]


   Last Admin: 11/05/18 17:11 Dose:  1 tab


Dextrose (Dextrose 50% Inj)  0 ml IV STAT PRN; Protocol


   PRN Reason: Hypoglycemia Protocol


Dextrose (Glutose 15)  0 gm PO ONCE PRN; Protocol


   PRN Reason: Hypoglycemia Protocol


Gabapentin (Neurontin)  100 mg PO Q12 [facility]


   Last Admin: 11/05/18 08:11 Dose:  100 mg


Glucagon (Glucagen Diagnostic Kit)  0 mg IM STAT PRN; Protocol


   PRN Reason: Hypoglycemia Protocol


Guaifenesin (Mucinex La)  600 mg PO Q12 [facility]


   Last Admin: 11/05/18 08:10 Dose:  600 mg


Home Med (Patient's Own Medication)  1 unit PO DAILY [facility]


   Last Admin: 11/05/18 08:11 Dose:  1 unit


Ibuprofen (Motrin Tab)  600 mg PO Q8 PRN


   PRN Reason: Pain, moderate (4-7)


   Last Admin: 11/05/18 10:25 Dose:  600 mg


Insulin Detemir (Levemir)  20 units SC HS [facility]


   Last Admin: 11/04/18 21:21 Dose:  20 units


Insulin Human Regular (Humulin R)  0 units SC ACCU-CHECK [facility]; Protocol


   Last Admin: 11/05/18 16:16 Dose:  Not Given


Lactic Acid (Lac-Hydrin 12% Lotion (225 G))  1 applic TOP Q8 [facility]


   Last Admin: 11/05/18 13:33 Dose:  1 applic


Lactulose (Enulose)  10 gm PO DAILY PRN


   PRN Reason: Constipation


   Last Admin: 10/27/18 08:35 Dose:  10 gm


Lidocaine (Lidoderm)  1 ea TD 0900 [facility]


   Last Admin: 11/05/18 08:12 Dose:  1 ea


Lisinopril (Zestril)  2.5 mg PO DAILY [facility]


   Last Admin: 11/05/18 08:10 Dose:  2.5 mg


Multivitamins/Minerals (Therapeutic-M Tab)  1 tab PO DAILY [facility]


   Last Admin: 11/05/18 08:11 Dose:  1 tab


Oxycodone/Acetaminophen (Percocet 5/325 Mg Tab)  1 tab PO Q6 PRN


   PRN Reason: Pain, severe (8-10)


   Stop: 11/06/18 16:13


Pantoprazole Sodium (Protonix Ec Tab)  20 mg PO DAILY [facility]


   Last Admin: 11/05/18 08:11 Dose:  20 mg


Sitagliptin Phosphate (Januvia)  100 mg PO DAILY [facility]


   Last Admin: 11/05/18 08:11 Dose:  100 mg











- Labs


Labs: 


                                        





                                 10/25/18 06:10 





                                 10/29/18 09:40

## 2018-11-05 NOTE — CP.PCM.PN
Subjective





- Date & Time of Evaluation


Date of Evaluation: 11/05/18


Time of Evaluation: 10:00





- Subjective


Subjective: 





patient seen during therapy. no acute events overnight. no other complaints 

reported.





Objective





- Vital Signs/Intake and Output


Vital Signs (last 24 hours): 


                                        











Temp Pulse Resp BP Pulse Ox


 


 98.1 F   86   20   139/67   97 


 


 11/05/18 07:28  11/05/18 10:28  11/05/18 07:28  11/05/18 08:10  11/05/18 07:28











- Medications


Medications: 


                               Current Medications





Albuterol/Ipratropium (Duoneb 3 Mg/0.5 Mg (3 Ml) Ud)  3 ml INH RTID [facility]


   Last Admin: 11/05/18 07:43 Dose:  3 ml


Carbidopa/Levodopa (Sinemet)  1 tab PO QID [facility]


   Last Admin: 11/05/18 13:33 Dose:  1 tab


Dextrose (Dextrose 50% Inj)  0 ml IV STAT PRN; Protocol


   PRN Reason: Hypoglycemia Protocol


Dextrose (Glutose 15)  0 gm PO ONCE PRN; Protocol


   PRN Reason: Hypoglycemia Protocol


Gabapentin (Neurontin)  100 mg PO Q12 [facility]


   Last Admin: 11/05/18 08:11 Dose:  100 mg


Glucagon (Glucagen Diagnostic Kit)  0 mg IM STAT PRN; Protocol


   PRN Reason: Hypoglycemia Protocol


Guaifenesin (Mucinex La)  600 mg PO Q12 [facility]


   Last Admin: 11/05/18 08:10 Dose:  600 mg


Home Med (Patient's Own Medication)  1 unit PO DAILY [facility]


   Last Admin: 11/05/18 08:11 Dose:  1 unit


Ibuprofen (Motrin Tab)  600 mg PO Q8 PRN


   PRN Reason: Pain, moderate (4-7)


   Last Admin: 11/05/18 10:25 Dose:  600 mg


Insulin Detemir (Levemir)  20 units SC HS [facility]


   Last Admin: 11/04/18 21:21 Dose:  20 units


Insulin Human Regular (Humulin R)  0 units SC ACCU-CHECK [facility]; Protocol


   Last Admin: 11/05/18 12:09 Dose:  Not Given


Lactic Acid (Lac-Hydrin 12% Lotion (225 G))  1 applic TOP Q8 [facility]


   Last Admin: 11/05/18 13:33 Dose:  1 applic


Lactulose (Enulose)  10 gm PO DAILY PRN


   PRN Reason: Constipation


   Last Admin: 10/27/18 08:35 Dose:  10 gm


Lidocaine (Lidoderm)  1 ea TD 0900 [facility]


   Last Admin: 11/05/18 08:12 Dose:  1 ea


Lisinopril (Zestril)  2.5 mg PO DAILY [facility]


   Last Admin: 11/05/18 08:10 Dose:  2.5 mg


Multivitamins/Minerals (Therapeutic-M Tab)  1 tab PO DAILY [facility]


   Last Admin: 11/05/18 08:11 Dose:  1 tab


Oxycodone/Acetaminophen (Percocet 5/325 Mg Tab)  1 tab PO Q6 PRN


   PRN Reason: Pain, severe (8-10)


   Stop: 11/06/18 16:13


Pantoprazole Sodium (Protonix Ec Tab)  20 mg PO DAILY [facility]


   Last Admin: 11/05/18 08:11 Dose:  20 mg


Sitagliptin Phosphate (Januvia)  100 mg PO DAILY [facility]


   Last Admin: 11/05/18 08:11 Dose:  100 mg











- Labs


Labs: 


                                        





                                 10/25/18 06:10 





                                 10/29/18 09:40 











Assessment and Plan





- Assessment and Plan (Free Text)


Assessment: 





79 y/o male with hx of DM 2, Parkinsons  who had a fall at home and sustained 

multiple left side rib fractures and noted to have cerebral hemorrhage that did 

not show progression. currently in acute rehab for PT.





Continue physical therapy.


Continue plan as ordered.

## 2018-11-06 RX ADMIN — IPRATROPIUM BROMIDE AND ALBUTEROL SULFATE SCH ML: .5; 3 SOLUTION RESPIRATORY (INHALATION) at 07:42

## 2018-11-06 RX ADMIN — Medication SCH TAB: at 08:31

## 2018-11-06 RX ADMIN — GUAIFENESIN SCH MG: 600 TABLET, EXTENDED RELEASE ORAL at 21:23

## 2018-11-06 RX ADMIN — LACTULOSE PRN GM: 10 SOLUTION ORAL at 13:38

## 2018-11-06 RX ADMIN — CARBIDOPA AND LEVODOPA SCH TAB: 25; 250 TABLET ORAL at 21:23

## 2018-11-06 RX ADMIN — CARBIDOPA AND LEVODOPA SCH TAB: 25; 250 TABLET ORAL at 17:37

## 2018-11-06 RX ADMIN — CARBIDOPA AND LEVODOPA SCH TAB: 25; 250 TABLET ORAL at 13:39

## 2018-11-06 RX ADMIN — PANTOPRAZOLE SODIUM SCH MG: 20 TABLET, DELAYED RELEASE ORAL at 08:32

## 2018-11-06 RX ADMIN — CARBIDOPA AND LEVODOPA SCH TAB: 25; 250 TABLET ORAL at 08:31

## 2018-11-06 RX ADMIN — INSULIN DETEMIR SCH UNITS: 100 INJECTION, SOLUTION SUBCUTANEOUS at 21:24

## 2018-11-06 RX ADMIN — GUAIFENESIN SCH MG: 600 TABLET, EXTENDED RELEASE ORAL at 08:30

## 2018-11-06 RX ADMIN — IPRATROPIUM BROMIDE AND ALBUTEROL SULFATE SCH ML: .5; 3 SOLUTION RESPIRATORY (INHALATION) at 13:13

## 2018-11-06 RX ADMIN — IPRATROPIUM BROMIDE AND ALBUTEROL SULFATE SCH ML: .5; 3 SOLUTION RESPIRATORY (INHALATION) at 19:25

## 2018-11-06 NOTE — CP.PCM.PN
Subjective





- Date & Time of Evaluation


Date of Evaluation: 11/06/18


Time of Evaluation: 19:24





- Subjective


Subjective: 





He was seen in the room with family present


denies sob/cp


improved left rib pain


doing well in therapies


continue current care


will need short [facility] stay prior to d/c home





Objective





- Vital Signs/Intake and Output


Vital Signs (last 24 hours): 


                                        











Temp Pulse Resp BP Pulse Ox


 


 97.3 F L  66   22   148/71   99 


 


 11/06/18 09:00  11/06/18 09:00  11/06/18 09:00  11/06/18 09:00  11/06/18 07:31











- Medications


Medications: 


                               Current Medications





Albuterol/Ipratropium (Duoneb 3 Mg/0.5 Mg (3 Ml) Ud)  3 ml INH RTID On license of [facility]


   Last Admin: 11/06/18 13:13 Dose:  3 ml


Carbidopa/Levodopa (Sinemet)  1 tab PO QID On license of [facility]


   Last Admin: 11/06/18 17:37 Dose:  1 tab


Dextrose (Dextrose 50% Inj)  0 ml IV STAT PRN; Protocol


   PRN Reason: Hypoglycemia Protocol


Dextrose (Glutose 15)  0 gm PO ONCE PRN; Protocol


   PRN Reason: Hypoglycemia Protocol


Gabapentin (Neurontin)  100 mg PO Q12 On license of [facility]


   Last Admin: 11/06/18 08:38 Dose:  100 mg


Glucagon (Glucagen Diagnostic Kit)  0 mg IM STAT PRN; Protocol


   PRN Reason: Hypoglycemia Protocol


Guaifenesin (Mucinex La)  600 mg PO Q12 On license of [facility]


   Last Admin: 11/06/18 08:30 Dose:  600 mg


Home Med (Patient's Own Medication)  1 unit PO DAILY On license of [facility]


   Last Admin: 11/06/18 08:30 Dose:  1 unit


Ibuprofen (Motrin Tab)  600 mg PO Q8 PRN


   PRN Reason: Pain, moderate (4-7)


   Last Admin: 11/05/18 10:25 Dose:  600 mg


Insulin Detemir (Levemir)  20 units SC HS On license of [facility]


   Last Admin: 11/05/18 21:05 Dose:  20 units


Insulin Human Regular (Humulin R)  0 units SC ACCU-CHECK On license of [facility]; Protocol


   Last Admin: 11/06/18 17:37 Dose:  Not Given


Lactic Acid (Lac-Hydrin 12% Lotion (225 G))  1 applic TOP Q8 On license of [facility]


   Last Admin: 11/06/18 14:00 Dose:  1 applic


Lactulose (Enulose)  10 gm PO DAILY PRN


   PRN Reason: Constipation


   Last Admin: 11/06/18 13:38 Dose:  10 gm


Lidocaine (Lidoderm)  1 ea TD 0900 On license of [facility]


   Last Admin: 11/06/18 08:32 Dose:  1 ea


Lisinopril (Zestril)  2.5 mg PO DAILY On license of [facility]


   Last Admin: 11/06/18 08:31 Dose:  2.5 mg


Multivitamins/Minerals (Therapeutic-M Tab)  1 tab PO DAILY On license of [facility]


   Last Admin: 11/06/18 08:31 Dose:  1 tab


Pantoprazole Sodium (Protonix Ec Tab)  20 mg PO DAILY On license of [facility]


   Last Admin: 11/06/18 08:32 Dose:  20 mg


Sitagliptin Phosphate (Januvia)  100 mg PO DAILY On license of [facility]


   Last Admin: 11/06/18 08:31 Dose:  100 mg











- Labs


Labs: 


                                        





                                 10/25/18 06:10 





                                 10/29/18 09:40

## 2018-11-06 NOTE — PCM.PSYTMC
Acute Rehab Team Conference -





- Vital Signs:


Vital Signs (Last 8 Hours): 


                                   Vital Signs











  11/06/18 11/06/18 11/06/18





  07:31 08:31 09:00


 


Temperature 97.3 F L  97.3 F L


 


Pulse Rate 66  66


 


Respiratory 22  22





Rate   


 


Blood Pressure 148/71 148/71 148/71


 


O2 Sat by Pulse 99  





Oximetry   











Pain: 0





- Precautions:


Precautions: Fall Prevention





- Medications/Other Issues:


Comment: Still tries to get up or transfer without assistance despite 

instructions





- Consults:


Comment: Dr. Ho





- Toileting:


Toileting: Maximal Assistance





- Bladder Management:


Bladder Pattern: Normal


Voiding Method: Toilet, Urinal


Bladder Management: Supervision


Other Intervention:: 0





- Transfers:


Transfers: Moderate Assistance





- ADL's:


ADL's: Moderate Assistance





- Pain Management:


Other Intervention:: On lidoderm patch daily and Ibuprofen 600mg PRN





- Patient/Family Teaching:


Other Intervention:: Fall and safety precautions





- Goals/Time Frame:


Comment: Per multidisciplinary care plan and goals





- Provider:


Registered Nurse:: Cori Farmer





Physical Therapy





- Bed Mobility


Bed Mobility: Verbal Cues, Contact Guard


Comment: log rolling method recommended





- Transfers


Wheelchair to Mat: Verbal Cues, Minimal Assistance


Sit to Stand: Verbal Cues, Minimal Assistance, Moderate Assistance


Comment: incr'd assist needed for t/f today due to incr L rib pain as session 

progressed.  (Pt recently CGA for t/f)





- Ambulation


Level of Assistance: Verbal Cues, Contact Guard


Distance (ft.): 200


Assistive Devices: Rolling Walker


Comment: x2, 100' x1, 20' x 2.  vc for upright posture, B heel strike, to incr 

JER, to remain inside frame of RW.  occ incr'd unsteadiness due to narrow JER.  

difficulty navigating turns and obstacles.  Incr'd L instability noted (marycarmen at 

foot/ankle) w/ fatigue.  noted LLE w/ slightly greater instability than RLE





- Stair Negotiation


Stairs: Level of Assistance: Verbal Cues, Minimal Assistance


Number of Stairs: 6


Handrails: Bilateral


Comment: 6 in steps, step to pattern.  vc for sequencing, poor carryover noted, 

narrow JER.  Pt fluctuated w/ step through and step to





- Standing Balance


Static Stand: Contact Guard Assist


Comment: supported





- Pain


Pain (assessed during therapy session): 9


Alleviating Techniques: Medication, Position Change, Relaxation Techniques, 

Exercise


Comment: L rib pain





- Insight/Carryover


Insight/Carryover: Fair





- Patient/Family Education


Comment: safety, Rehab goals, POC, fxnl mob, DME, posture, benefits of being 

OOB, activity pacing, CVA recovery related topics,





- Assessment/Plan


Assessment: 79 yo male admitted to [facility] acute rehab s/p ICH w/ fall and L rib fx

and comorbidity of PD. Pt's t/f status fluctuates due to rib pain. Despite this 

pt is making progress and will benefit from cont'd skilled acute rehab PT to 

maximize fxnl mob, balance, activity tolerance and safety.





- Goals


Tiimeframe: 3 weeks


Goals: Sit < > supine with S.  Sit < > stand transfers with RW and S.  Pt will 

ambulate 150 ft with RW and S





- Provider


Physical Therapist:: Marilyn Mcpherson


License Number:: [id number]





Occupational Therapy





- Arousal/Attention/Orientation


Level of Consciousness: Awake, Alert


Patient Orientation: Person, Place, Time, Appropriate to Age, Appropriate to 

Situation


Assessment Comment: -less L rib area pain.  -less overall rigidity.  -less 

overall lethargy.  -increased activity tolerance and overall motor control.  -

increased psotural control





- ADL/IADL


Self Feeding: Set-up Help


Grooming: Supervision, Set-up Help


Bathing-Upper Ext: Verbal Cues, Set-up Help, Minimal Assistance


Bathing-Lower Ext: Verbal Cues, Set-up Help, Moderate Assistance


Dressing-Upper Ext: Independent, Set-up Help


Dressing-Lower Ext: Verbal Cues, Set-up Help, Minimal Assistance


Comment: -pt with trunk mobility/ BUE/BLES flexibility & ROM thus able to 

complete lower body adls with greater I from od assist to min assist 

level--takes longer than customary





- Sitting Balance


Static Sitting: Supervision


Dynamic Sitting: Reaches across midline, Reaches out of base of support, Reaches

within base of support, Contact Guard Assist


Comment: unsupported at edge of bed





- Transfers


Wheelchair to Bed Transfers: Verbal Cues, Set-up Help, Minimal Assistance


Toilet Transfers: Verbal Cues, Set-up Help, Minimal Assistance


Comment: shower chair transfers: Mod assist & verbal cues 2' slippery surfaces





- Wheelchair Management


Level of Assistance: Supervision, Verbal Cues, Set-up Help


Distance (ft.): 150





- Upper Extremity Status


Right Upper Extremity Comment: AROM is WNLs: 4/5


Left Upper Extremity Comment: AROM is WNLs: 4/5





- Pain


Pain (assessed during therapy session): 3


Alleviating Techniques: Medication, Position Change, Distraction, Inactivity


Comment: with less pain overall  in L rib area





- Insight/Carryover


Insight/Carryover: Fair





- Patient/Family Education


Comment: -adls/transfers/mobility training--further training needed.  -caregiver

ed re: pt's limitation, goals and likely need 24 hour care upon dc from acute 

rehab.  -w/c propulsion/management.  -rehab/OT goals, plan of care.  -encourage 

adl participation, toileting.  -review use of call bell for safety, pt with 

limited insight re: limitations & fall risk as eveidenced as attempting to get 

up without assistance.  -toileting program--to prevent bowel/bladder accidents





- Assessment/Plan


Assessment: Pt is a 78 year old bilingual(English/English) male with dx: 

intracranial hemorrhage.  *Precautions:  falls(w/c alarm), cardiac, impaired 

cognition/memory, impaired respiratory funtions(rib pain).  Pt continues to make

demonstrate steady improvements  in self care, transfers, functional mobility 

with decrease overall pain and shortness of breath. Pt's O2 sat post tx 95-96% 

room air--pt no longer desats with activities. Pt exhibits increase 

endurance/activity tolerance during today's tx session. Pt limited by L rib pain

, postural control, rigidity  and activity tolerance.  Pt will continue skilled 

OT to maxmize function in self care, transfers/mobility using adaptive/c

ompensatory strategies. Pt requires less rest breaks with functional activities 

above--thus increased activity tolerance. Pt has limited insight into 

limitations as evidenced as getting up without assistance although reminded to 

call for assistance.  Pt may likely need 24 hour care at home or [facility] after acute

rehab stay.  Goal: Supervision for self care, transfers/mobility with assistive 

device.





- Goals


Timeframe: 8 days


Comment: *FEEDING: Mod I-open containers, packages, cut up food  with Mod I.  

*GROOMING: I/setup seated.  *LOWER BODY DRESSING: Pt to don pants/undergarment, 

B socks with CG/CS assist  & verbal cues seated in chair, cross over method.  

*TOILETING: CG/Min assist  and verbal cues steadying with RW, grab bar prn 100% 

of time.  *TRANSFERS:<->bed, commode, w/c, & shower bench Min assist and verbal 

cues.  *W/C PROPULSION: 150 feet with Distant Supervision, manage B brakes with 

Supervision for safety.  *BATHING: minimal  assist & verbal cues seated.  

*CAREGIVER ED: caregiver to assist with adls, transfers & mobility with verbal 

cues form staff.  *STATIC SITTING, unsupported: I, dynamic(unsupported) 

Supervision for completion of am care





- Provider


Occupational Therapist:: Dyana Khan


License Number: [id number]





Speech Therapy





- Consult Information


Patient on Program: Yes


Medical Diagnosis: ICH; Parkinson's disease





- Assessment


Memory Impairment: Mild


Speech/Articulation Impairment: Moderate





- Plan


Assessment: Jonatan Cline presents with 1.) moderate-severe dysphonia 

and mild dysarthria characterized by breathy, hoarse vocal quality with impaired

vocal loudness, periods of aphonia, and pitch breaks, as well as impaired 

articulatory precision at the sentence level, all negatively impacting speech 

intelligibility; and 2.) mild cognitive-linguistic deficits characterized by 

impaired short-term recall and word retrieval. Pt is demonstrating improvements 

in both vocal quality and memory over the course of tx; he reported that his 

family has noticed improvements in his voice as well over the phone. Pt 

demonstrates excellent participation in tx tasks and would benefit from 

continued ST for improved vocal quality, intelligibility, and cognitive-

linguistic skills.


Plan: Continue Speech/Language Therapy


Frequency: 3-5 times per week


Duration: 1 week


Goals/Timeframe: Please see progress note dated 11/5/18 for updated goals/POC


Recommendations: Continue ST 3-5x/week





- Provider


Therapist: Shanell Tate


License Number: [id number]





Recreational Therapy





- Participation


Participation: Participates in Individual and/or Group Sessions





- Socialization


Level of Socialization: Initiates/interacts freely with care givers and peer





- Diversional Time


Diversional Time: enjoys playing dominoes, watching television





- Assessment


Assessment/Plan: Pt is agreeable to participate in recreation therapy sessions 

following encouragement. Pt enjoys participating in dominoes task as he can 

recall task rules independently. For any new leisure tasks that pt is oriented 

to, pt requires min-mod verbal cues for direction following and carryover of 

task rules. Pt's barriers to participation is pain and/or fatigue. However, 

provide pt with rest breaks and deep breathing in between rounds of leisure 

tasks. Pt will continue to benefit from participating in recreation therapy 

sessions throughout stay on unit.


Problems Currently Limiting Participation: Pt will be encouraged to participate 

in 1:1 and group recreation therapy sessions 3-5x week to improve direction 

following, command following, leisure awareness level, attention to task, and 

divert pt from pain.


Goals and Time Frame: Pt will be encouraged to participate in 1:1 and group 

recreation therapy sessions 3-5x week to improve direction following, command 

following, leisure awareness level, attention to task, and divert pt from pain.





- Provider


Therapist: Alea Albright





Nutrition





- Current Diet


Current Diet/Supplement/Feedings: Moderate consistent CHO heart healthy diet





- Appetite


Percent Meal Consumed: 50-74%





- Assessment/Goals/Time Frame


Assessments/Goals/Time Frame: Pt at moderate nutritional risk.  goals: 1.  Pt to

consume % of meals(partially met, continue).  2.  Blood glucoses to be 

between  mg/dl(partially met, continue).  Follow-up due on 11/12/2018





- Provider


Provider: Lesa Yi





Case Management





- Psychosocial Assessment


Support Systems: Patient's daughter Paulette is supportive and visits daily- 

[phone]


Psychological Interventions/Needs: Patient is alert with cognitive impairments.


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 

likely require 24 hour supervision/care at home after rehab


Patient/Family Meeting: CM met with patient and rehab team, discussion held with

certified English speaking  Shanell Lopez.


Intervention/Goal/Outcome: 1. Goal: Supervision overall? 2. Home vs [facility] d

ependent on progress and family support- discharge options discussed with 

family, daughter to explored option of private hiring for services at home vs 

[facility] with family. 3. Tentative discharge date: 11/8. 4. continued stay auth, LAD:

11/5-  updates to be faxed at that time F: [phone]. 5. continued emotional 

support.





- Discharge Plan


Discharge Plan: Subacute care





- Provider


Provider: Freddie Duff


License Number: [id number]





Rehabilitation Plan





- Treatment Plan


Treatment Plan: Physical Therapy, Occupational Therapy, Speech, Dietary, 

Patient/Family Education





- Discharge Plan


Estimated Date of Discharge: 11/08/18


Discharge to: Subacute

## 2018-11-06 NOTE — PN
DATE:  11/06/2018



SUBJECTIVE:  The patient seen and examined.  Interim events noted.  The

patient remains in Acute Rehab Unit.  Physiatry Intervention noted and

appreciated.  The patient feels okay.  Denies any specific complaint.  No

chest pain or shortness of breath.



PHYSICAL EXAMINATION:

GENERAL:  The patient is in no acute distress.

VITAL SIGNS:  Stable.

HEART:  S1 and S2 normal and regular.

LUNGS:  Good bilateral air exchange.

ABDOMEN:  Soft and nontender.

EXTREMITIES:  No edema.  No calf swelling.  No tenderness.  No acute

ischemia.

CNS:  Exam is essentially unchanged.  There does not seem to be acute gross

focal motor or sensory neurological deficit.  The patient is remarkably

well after _____.



DIAGNOSTIC DATA:  Available diagnostic data reviewed.



ASSESSMENT AND PLAN:  Overall, the patient is clinically stable.  Plan as

ordered.







__________________________________________

Mario Salcedo MD



DD:  11/06/2018 8:58:14

DT:  11/06/2018 9:00:40

Job # [id number]

## 2018-11-07 RX ADMIN — Medication SCH TAB: at 08:09

## 2018-11-07 RX ADMIN — IPRATROPIUM BROMIDE AND ALBUTEROL SULFATE SCH ML: .5; 3 SOLUTION RESPIRATORY (INHALATION) at 13:24

## 2018-11-07 RX ADMIN — CARBIDOPA AND LEVODOPA SCH TAB: 25; 250 TABLET ORAL at 13:16

## 2018-11-07 RX ADMIN — LACTULOSE PRN GM: 10 SOLUTION ORAL at 08:08

## 2018-11-07 RX ADMIN — GUAIFENESIN SCH MG: 600 TABLET, EXTENDED RELEASE ORAL at 08:09

## 2018-11-07 RX ADMIN — IPRATROPIUM BROMIDE AND ALBUTEROL SULFATE SCH ML: .5; 3 SOLUTION RESPIRATORY (INHALATION) at 19:14

## 2018-11-07 RX ADMIN — CARBIDOPA AND LEVODOPA SCH TAB: 25; 250 TABLET ORAL at 16:51

## 2018-11-07 RX ADMIN — IPRATROPIUM BROMIDE AND ALBUTEROL SULFATE SCH ML: .5; 3 SOLUTION RESPIRATORY (INHALATION) at 07:38

## 2018-11-07 RX ADMIN — PANTOPRAZOLE SODIUM SCH MG: 20 TABLET, DELAYED RELEASE ORAL at 08:09

## 2018-11-07 RX ADMIN — CARBIDOPA AND LEVODOPA SCH TAB: 25; 250 TABLET ORAL at 08:09

## 2018-11-07 RX ADMIN — CARBIDOPA AND LEVODOPA SCH TAB: 25; 250 TABLET ORAL at 21:14

## 2018-11-07 RX ADMIN — INSULIN DETEMIR SCH UNITS: 100 INJECTION, SOLUTION SUBCUTANEOUS at 21:14

## 2018-11-07 RX ADMIN — GUAIFENESIN SCH MG: 600 TABLET, EXTENDED RELEASE ORAL at 21:14

## 2018-11-08 RX ADMIN — IPRATROPIUM BROMIDE AND ALBUTEROL SULFATE SCH ML: .5; 3 SOLUTION RESPIRATORY (INHALATION) at 08:27

## 2018-11-08 RX ADMIN — IPRATROPIUM BROMIDE AND ALBUTEROL SULFATE SCH: .5; 3 SOLUTION RESPIRATORY (INHALATION) at 14:30

## 2018-11-08 RX ADMIN — INSULIN DETEMIR SCH UNITS: 100 INJECTION, SOLUTION SUBCUTANEOUS at 21:42

## 2018-11-08 RX ADMIN — CARBIDOPA AND LEVODOPA SCH TAB: 25; 250 TABLET ORAL at 17:19

## 2018-11-08 RX ADMIN — CARBIDOPA AND LEVODOPA SCH TAB: 25; 250 TABLET ORAL at 08:35

## 2018-11-08 RX ADMIN — IPRATROPIUM BROMIDE AND ALBUTEROL SULFATE SCH ML: .5; 3 SOLUTION RESPIRATORY (INHALATION) at 19:30

## 2018-11-08 RX ADMIN — GUAIFENESIN SCH MG: 600 TABLET, EXTENDED RELEASE ORAL at 21:43

## 2018-11-08 RX ADMIN — CARBIDOPA AND LEVODOPA SCH TAB: 25; 250 TABLET ORAL at 12:22

## 2018-11-08 RX ADMIN — CARBIDOPA AND LEVODOPA SCH TAB: 25; 250 TABLET ORAL at 21:43

## 2018-11-08 RX ADMIN — Medication SCH TAB: at 08:35

## 2018-11-08 RX ADMIN — PANTOPRAZOLE SODIUM SCH MG: 20 TABLET, DELAYED RELEASE ORAL at 08:36

## 2018-11-08 RX ADMIN — GUAIFENESIN SCH MG: 600 TABLET, EXTENDED RELEASE ORAL at 08:35

## 2018-11-08 NOTE — CP.PCM.PN
Subjective





- Date & Time of Evaluation


Date of Evaluation: 11/08/18


Time of Evaluation: 13:56





- Subjective


Subjective: 





Patient seen in the room with family


I put an appeal call in to guy but the doctor never called ernesto and it is now 

2 hours past the set time


He needs CG/Min A and needs a short course of [facility] prior to d/c home


+ DJD as well making progress difficult but he is working very hard and making 

good gains.





Objective





- Vital Signs/Intake and Output


Vital Signs (last 24 hours): 


                                        











Temp Pulse Resp BP Pulse Ox


 


 97.8 F   75   22   132/66   98 


 


 11/08/18 08:37  11/08/18 08:37  11/08/18 08:37  11/08/18 08:37  11/08/18 08:37











- Medications


Medications: 


                               Current Medications





Acetaminophen (Tylenol 325mg Tab)  650 mg PO Q4 PRN


   PRN Reason: Pain, severe (8-10)


Acetaminophen (Tylenol 325mg Tab)  650 mg PO Q4 PRN


   PRN Reason: Pain, moderate (4-7)


Albuterol/Ipratropium (Duoneb 3 Mg/0.5 Mg (3 Ml) Ud)  3 ml INH RTID [facility]


   Last Admin: 11/08/18 08:27 Dose:  3 ml


Carbidopa/Levodopa (Sinemet)  1 tab PO QID [facility]


   Last Admin: 11/08/18 12:22 Dose:  1 tab


Dextrose (Dextrose 50% Inj)  0 ml IV STAT PRN; Protocol


   PRN Reason: Hypoglycemia Protocol


Dextrose (Glutose 15)  0 gm PO ONCE PRN; Protocol


   PRN Reason: Hypoglycemia Protocol


Gabapentin (Neurontin)  100 mg PO Q12 [facility]


   Last Admin: 11/08/18 08:35 Dose:  100 mg


Glucagon (Glucagen Diagnostic Kit)  0 mg IM STAT PRN; Protocol


   PRN Reason: Hypoglycemia Protocol


Guaifenesin (Mucinex La)  600 mg PO Q12 [facility]


   Last Admin: 11/08/18 08:35 Dose:  600 mg


Home Med (Patient's Own Medication)  1 unit PO DAILY [facility]


   Last Admin: 11/08/18 08:34 Dose:  1 unit


Insulin Detemir (Levemir)  20 units SC HS [facility]


   Last Admin: 11/07/18 21:14 Dose:  20 units


Insulin Human Regular (Humulin R)  0 units SC ACCU-CHECK [facility]; Protocol


   Last Admin: 11/08/18 12:22 Dose:  2 units


Lactic Acid (Lac-Hydrin 12% Lotion (225 G))  1 applic TOP Q8 [facility]


   Last Admin: 11/08/18 13:05 Dose:  1 applic


Lactulose (Enulose)  10 gm PO DAILY PRN


   PRN Reason: Constipation


   Last Admin: 11/07/18 08:08 Dose:  10 gm


Lidocaine (Lidoderm)  1 ea TD 0900 [facility]


   Last Admin: 11/08/18 08:34 Dose:  1 ea


Lisinopril (Zestril)  2.5 mg PO DAILY [facility]


   Last Admin: 11/08/18 08:34 Dose:  2.5 mg


Multivitamins/Minerals (Therapeutic-M Tab)  1 tab PO DAILY [facility]


   Last Admin: 11/08/18 08:35 Dose:  1 tab


Pantoprazole Sodium (Protonix Ec Tab)  20 mg PO DAILY [facility]


   Last Admin: 11/08/18 08:36 Dose:  20 mg


Sitagliptin Phosphate (Januvia)  100 mg PO DAILY [facility]


   Last Admin: 11/08/18 08:35 Dose:  100 mg











- Labs


Labs: 


                                        





                                 10/25/18 06:10 





                                 10/29/18 09:40

## 2018-11-09 VITALS — DIASTOLIC BLOOD PRESSURE: 54 MMHG | SYSTOLIC BLOOD PRESSURE: 120 MMHG | HEART RATE: 82 BPM

## 2018-11-09 VITALS — TEMPERATURE: 97.8 F | OXYGEN SATURATION: 99 % | RESPIRATION RATE: 22 BRPM

## 2018-11-09 RX ADMIN — IPRATROPIUM BROMIDE AND ALBUTEROL SULFATE SCH ML: .5; 3 SOLUTION RESPIRATORY (INHALATION) at 08:11

## 2018-11-09 RX ADMIN — CARBIDOPA AND LEVODOPA SCH TAB: 25; 250 TABLET ORAL at 16:54

## 2018-11-09 RX ADMIN — CARBIDOPA AND LEVODOPA SCH TAB: 25; 250 TABLET ORAL at 13:32

## 2018-11-09 RX ADMIN — CARBIDOPA AND LEVODOPA SCH TAB: 25; 250 TABLET ORAL at 08:59

## 2018-11-09 RX ADMIN — GUAIFENESIN SCH MG: 600 TABLET, EXTENDED RELEASE ORAL at 08:58

## 2018-11-09 RX ADMIN — Medication SCH TAB: at 08:59

## 2018-11-09 RX ADMIN — PANTOPRAZOLE SODIUM SCH MG: 20 TABLET, DELAYED RELEASE ORAL at 08:59

## 2018-11-09 RX ADMIN — IPRATROPIUM BROMIDE AND ALBUTEROL SULFATE SCH: .5; 3 SOLUTION RESPIRATORY (INHALATION) at 14:03
